# Patient Record
Sex: FEMALE | Race: ASIAN | NOT HISPANIC OR LATINO
[De-identification: names, ages, dates, MRNs, and addresses within clinical notes are randomized per-mention and may not be internally consistent; named-entity substitution may affect disease eponyms.]

---

## 2019-01-17 PROBLEM — Z00.00 ENCOUNTER FOR PREVENTIVE HEALTH EXAMINATION: Status: ACTIVE | Noted: 2019-01-17

## 2019-01-25 ENCOUNTER — APPOINTMENT (OUTPATIENT)
Dept: THORACIC SURGERY | Facility: CLINIC | Age: 64
End: 2019-01-25
Payer: COMMERCIAL

## 2019-01-25 VITALS
SYSTOLIC BLOOD PRESSURE: 138 MMHG | HEIGHT: 66 IN | RESPIRATION RATE: 18 BRPM | HEART RATE: 69 BPM | OXYGEN SATURATION: 99 % | WEIGHT: 153 LBS | TEMPERATURE: 98.2 F | DIASTOLIC BLOOD PRESSURE: 63 MMHG | BODY MASS INDEX: 24.59 KG/M2

## 2019-01-25 DIAGNOSIS — Z78.9 OTHER SPECIFIED HEALTH STATUS: ICD-10-CM

## 2019-01-25 PROCEDURE — 99205 OFFICE O/P NEW HI 60 MIN: CPT

## 2019-01-26 RX ORDER — AMOXICILLIN 500 MG/1
500 TABLET, FILM COATED ORAL
Qty: 56 | Refills: 0 | Status: DISCONTINUED | COMMUNITY
Start: 2018-12-03

## 2019-01-26 RX ORDER — DIPHENHYDRAMINE HYDROCHLORIDE 25 MG/1
25 CAPSULE ORAL
Qty: 30 | Refills: 0 | Status: DISCONTINUED | COMMUNITY
Start: 2018-11-26

## 2019-01-26 RX ORDER — ERGOCALCIFEROL 1.25 MG/1
1.25 MG CAPSULE, LIQUID FILLED ORAL
Qty: 4 | Refills: 0 | Status: DISCONTINUED | COMMUNITY
Start: 2018-12-03

## 2019-01-26 RX ORDER — SENNA 8.6 MG/1
8.6 TABLET, FILM COATED ORAL
Qty: 60 | Refills: 0 | Status: DISCONTINUED | COMMUNITY
Start: 2018-11-26

## 2019-01-26 RX ORDER — SIMETHICONE 80 MG/1
80 TABLET, CHEWABLE ORAL
Qty: 80 | Refills: 0 | Status: DISCONTINUED | COMMUNITY
Start: 2018-12-31

## 2019-01-26 RX ORDER — RANITIDINE 150 MG/1
150 TABLET ORAL
Qty: 60 | Refills: 0 | Status: DISCONTINUED | COMMUNITY
Start: 2018-11-26

## 2019-01-26 RX ORDER — OMEPRAZOLE 40 MG/1
40 CAPSULE, DELAYED RELEASE ORAL
Qty: 30 | Refills: 0 | Status: DISCONTINUED | COMMUNITY
Start: 2018-12-31

## 2019-01-26 RX ORDER — CLARITHROMYCIN 500 MG/1
500 TABLET, FILM COATED ORAL
Qty: 28 | Refills: 0 | Status: DISCONTINUED | COMMUNITY
Start: 2018-12-03

## 2019-01-26 RX ORDER — OMEPRAZOLE 20 MG/1
20 CAPSULE, DELAYED RELEASE ORAL
Qty: 28 | Refills: 0 | Status: DISCONTINUED | COMMUNITY
Start: 2018-12-03

## 2019-01-27 NOTE — ASSESSMENT
[FreeTextEntry1] : 63 year old female, Fujanese/ Mandarin speaking, was referred by gastroenterologist Dr. 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 She has tried Zantac and Omeprazole but still had gastric pain. \par \par Colonoscopy on 01/11/2019 showed and removed 2-4 mm polyps. \par \par EGD on 01/12/2019 showed a gastric mass with ulceration. Pathology revealed poorly differentiated adenocarcinoma.\par \par Chest CT on 01/16/2019:\par - mid bronchiectasis in the left lung base anteriorly\par - mild platelike atelectasis and dependent changed in the lower lobes, greater on the left\par - 0.6 cm RLL posterior subpleural nodule\par - prominent perigastric lymph nodes \par - near circumferential  wall thickening of the distal stomach, 1 cm \par - 0.8 cm left adrenal nodule\par \par EUS on 01/18/2019:\par - mass in the lesser curvature of the stomach to be T4 No Mx adenocarcinoma. \par \par On this visit, the patient reported occasional dysphagia. She further denied having a stuck sensation when swallowing, reflux, palpitations, chest pain, SOB, URI, cough, hemoptysis, fever, or recent illnesses/ hospitalizations.\par \par I have reviewed the patient’s medical records and diagnostic images during the time of this office visit and have made the following recommendations.\par Plan:\par 1. PET/CT scan\par 2. Follow up with oncologist Dr. Manjinder Garcia.\par 3. RTC after testing\par \par \par Written by Ambrocio Ham RN acting as a scribe for Dr. Flores. \par “The documentation recorded by the scribe accurately reflects the service I personally performed and the decisions made by me.” Signature Milton Flores MD.\par Date 01/25/2019

## 2019-01-27 NOTE — PHYSICAL EXAM
[General Appearance - Alert] : alert [General Appearance - In No Acute Distress] : in no acute distress [Sclera] : the sclera and conjunctiva were normal [Outer Ear] : the ears and nose were normal in appearance [Hearing Threshold Finger Rub Not [place]] : hearing was normal [Neck Appearance] : the appearance of the neck was normal [Respiration, Rhythm And Depth] : normal respiratory rhythm and effort [Exaggerated Use Of Accessory Muscles For Inspiration] : no accessory muscle use [Auscultation Breath Sounds / Voice Sounds] : lungs were clear to auscultation bilaterally [Heart Rate And Rhythm] : heart rate was normal and rhythm regular [Heart Sounds] : normal S1 and S2 [Chest Visual Inspection Thoracic Asymmetry] : no chest asymmetry [Diminished Respiratory Excursion] : normal chest expansion [2+] : left 2+ [No Pulse Delay] : no pulse delay [Bowel Sounds] : normal bowel sounds [Abdomen Soft] : soft [Abdomen Tenderness] : non-tender [Supraclavicular Lymph Nodes Enlarged Bilaterally] : supraclavicular [Axillary Lymph Nodes Enlarged Bilaterally] : axillary [No CVA Tenderness] : no ~M costovertebral angle tenderness [Nail Clubbing] : no clubbing  or cyanosis of the fingernails [Involuntary Movements] : no involuntary movements were seen [Motor Tone] : muscle strength and tone were normal [] : no rash [Skin Lesions] : no skin lesions [Sensation] : the sensory exam was normal to light touch and pinprick [Motor Exam] : the motor exam was normal [Oriented To Time, Place, And Person] : oriented to person, place, and time [Impaired Insight] : insight and judgment were intact [Fingers] :  capillary refill of the fingers was normal [FreeTextEntry1] : n/a

## 2019-01-27 NOTE — CONSULT LETTER
[Dear  ___] : Dear  [unfilled], [Consult Letter:] : I had the pleasure of evaluating your patient, [unfilled]. [( Thank you for referring [unfilled] for consultation for _____ )] : Thank you for referring [unfilled] for consultation for [unfilled] [Please see my note below.] : Please see my note below. [Consult Closing:] : Thank you very much for allowing me to participate in the care of this patient.  If you have any questions, please do not hesitate to contact me. [Sincerely,] : Sincerely, [DrJose Alejandro  ___] : Dr. DEWITT [DrJose Alejandro ___] : Dr. DEWITT [FreeTextEntry2] : Sonia Daniels MD (Ref/ GI) \par Dr. Manjinder Garcia (Onc)\par Dr. Blaire Venegas (PCP)\par \par  [FreeTextEntry3] : \par \par Milton Flores MD.\par Professor, Cardiovascular & Thoracic Surgery\par [facility] [employer] [facility] [employer]\par Director of [facility] \par Director of Thoracic Surgery, [facility]\par [facility]\par [address]\par [facility] 4th Floor\par [place] [address]\par Phone: [phone]\par Fax: [phone]

## 2019-01-27 NOTE — HISTORY OF PRESENT ILLNESS
[FreeTextEntry1] : 63 year old female, Fujanese/ Mandarin speaking, was referred by gastroenterologist Dr. 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 She has tried Zantac and Omeprazole but still had gastric pain. \par \par Colonoscopy on 01/11/2019 showed and removed 2-4 mm polyps. \par \par EGD on 01/12/2019 showed a gastric mass with ulceration. Pathology revealed poorly differentiated adenocarcinoma.\par \par Chest CT on 01/16/2019:\par - mid bronchiectasis in the left lung base anteriorly\par - mild platelike atelectasis and dependent changed in the lower lobes, greater on the left\par - 0.6 cm RLL posterior subpleural nodule\par - prominent perigastric lymph nodes \par - near circumferential  wall thickening of the distal stomach, 1 cm \par - 0.8 cm left adrenal nodule\par \par EUS on 01/18/2019:\par - mass in the lesser curvature of the stomach to be T4 No Mx adenocarcinoma. \par \par On this visit, the patient reported occasional dysphagia. She further denied having a stuck sensation when swallowing, reflux, palpitations, chest pain, SOB, URI, cough, hemoptysis, fever, or recent illnesses/ hospitalizations.

## 2019-02-04 ENCOUNTER — FORM ENCOUNTER (OUTPATIENT)
Age: 64
End: 2019-02-04

## 2019-02-05 ENCOUNTER — FORM ENCOUNTER (OUTPATIENT)
Age: 64
End: 2019-02-05

## 2019-02-05 ENCOUNTER — TRANSCRIPTION ENCOUNTER (OUTPATIENT)
Age: 64
End: 2019-02-05

## 2019-02-05 ENCOUNTER — OUTPATIENT (OUTPATIENT)
Dept: OUTPATIENT SERVICES | Facility: HOSPITAL | Age: 64
LOS: 1 days | End: 2019-02-05
Payer: COMMERCIAL

## 2019-02-05 LAB — GLUCOSE BLDC GLUCOMTR-MCNC: 89 MG/DL — SIGNIFICANT CHANGE UP (ref 70–99)

## 2019-02-05 PROCEDURE — 78815 PET IMAGE W/CT SKULL-THIGH: CPT | Mod: 26

## 2019-02-05 PROCEDURE — 78815 PET IMAGE W/CT SKULL-THIGH: CPT

## 2019-02-05 PROCEDURE — A9552: CPT

## 2019-02-05 PROCEDURE — 82962 GLUCOSE BLOOD TEST: CPT

## 2019-02-06 ENCOUNTER — RESULT REVIEW (OUTPATIENT)
Age: 64
End: 2019-02-06

## 2019-02-06 ENCOUNTER — APPOINTMENT (OUTPATIENT)
Dept: THORACIC SURGERY | Facility: HOSPITAL | Age: 64
End: 2019-02-06

## 2019-02-06 ENCOUNTER — OUTPATIENT (OUTPATIENT)
Dept: OUTPATIENT SERVICES | Facility: HOSPITAL | Age: 64
LOS: 1 days | Discharge: HOME | End: 2019-02-06
Payer: MEDICAID

## 2019-02-06 VITALS
TEMPERATURE: 98 F | OXYGEN SATURATION: 100 % | HEART RATE: 64 BPM | DIASTOLIC BLOOD PRESSURE: 60 MMHG | RESPIRATION RATE: 16 BRPM | WEIGHT: 125 LBS | HEIGHT: 65.75 IN | SYSTOLIC BLOOD PRESSURE: 119 MMHG

## 2019-02-06 VITALS
HEART RATE: 80 BPM | OXYGEN SATURATION: 100 % | SYSTOLIC BLOOD PRESSURE: 123 MMHG | DIASTOLIC BLOOD PRESSURE: 75 MMHG | RESPIRATION RATE: 16 BRPM

## 2019-02-06 DIAGNOSIS — C16.9 MALIGNANT NEOPLASM OF STOMACH, UNSPECIFIED: ICD-10-CM

## 2019-02-06 LAB
ANION GAP SERPL CALC-SCNC: 13 MMO/L — SIGNIFICANT CHANGE UP (ref 7–14)
APTT BLD: 34.2 SEC — SIGNIFICANT CHANGE UP (ref 27.5–36.3)
BLD GP AB SCN SERPL QL: NEGATIVE — SIGNIFICANT CHANGE UP
BUN SERPL-MCNC: 15 MG/DL — SIGNIFICANT CHANGE UP (ref 7–23)
CALCIUM SERPL-MCNC: 9.3 MG/DL — SIGNIFICANT CHANGE UP (ref 8.4–10.5)
CHLORIDE SERPL-SCNC: 103 MMOL/L — SIGNIFICANT CHANGE UP (ref 98–107)
CHLORIDE SERPL-SCNC: 103 MMOL/L — SIGNIFICANT CHANGE UP (ref 98–107)
CO2 SERPL-SCNC: 24 MMOL/L — SIGNIFICANT CHANGE UP (ref 22–31)
CO2 SERPL-SCNC: 24 MMOL/L — SIGNIFICANT CHANGE UP (ref 22–31)
CREAT SERPL-MCNC: 0.76 MG/DL — SIGNIFICANT CHANGE UP (ref 0.5–1.3)
GLUCOSE SERPL-MCNC: 105 MG/DL — HIGH (ref 70–99)
HCT VFR BLD CALC: 32.9 % — LOW (ref 34.5–45)
HCV AB S/CO SERPL IA: 0.05 S/CO — SIGNIFICANT CHANGE UP
HCV AB SERPL-IMP: SIGNIFICANT CHANGE UP
HGB BLD-MCNC: 10.4 G/DL — LOW (ref 11.5–15.5)
INR BLD: 1.03 — SIGNIFICANT CHANGE UP (ref 0.88–1.17)
MCHC RBC-ENTMCNC: 30.1 PG — SIGNIFICANT CHANGE UP (ref 27–34)
MCHC RBC-ENTMCNC: 31.6 % — LOW (ref 32–36)
MCV RBC AUTO: 95.1 FL — SIGNIFICANT CHANGE UP (ref 80–100)
NRBC # FLD: 0 K/UL — LOW (ref 25–125)
PLATELET # BLD AUTO: 164 K/UL — SIGNIFICANT CHANGE UP (ref 150–400)
PMV BLD: 11.5 FL — SIGNIFICANT CHANGE UP (ref 7–13)
POTASSIUM SERPL-MCNC: 4.7 MMOL/L — SIGNIFICANT CHANGE UP (ref 3.5–5.3)
POTASSIUM SERPL-MCNC: 4.7 MMOL/L — SIGNIFICANT CHANGE UP (ref 3.5–5.3)
POTASSIUM SERPL-SCNC: 4.7 MMOL/L — SIGNIFICANT CHANGE UP (ref 3.5–5.3)
POTASSIUM SERPL-SCNC: 4.7 MMOL/L — SIGNIFICANT CHANGE UP (ref 3.5–5.3)
PROTHROM AB SERPL-ACNC: 11.5 SEC — SIGNIFICANT CHANGE UP (ref 9.8–13.1)
RBC # BLD: 3.46 M/UL — LOW (ref 3.8–5.2)
RBC # FLD: 13.3 % — SIGNIFICANT CHANGE UP (ref 10.3–14.5)
RH IG SCN BLD-IMP: POSITIVE — SIGNIFICANT CHANGE UP
RH IG SCN BLD-IMP: POSITIVE — SIGNIFICANT CHANGE UP
SODIUM SERPL-SCNC: 140 MMOL/L — SIGNIFICANT CHANGE UP (ref 135–145)
SODIUM SERPL-SCNC: 140 MMOL/L — SIGNIFICANT CHANGE UP (ref 135–145)
WBC # BLD: 3.36 K/UL — LOW (ref 3.8–10.5)
WBC # FLD AUTO: 3.36 K/UL — LOW (ref 3.8–10.5)

## 2019-02-06 PROCEDURE — 36561 INSERT TUNNELED CV CATH: CPT | Mod: AS

## 2019-02-06 PROCEDURE — 71045 X-RAY EXAM CHEST 1 VIEW: CPT | Mod: 26

## 2019-02-06 PROCEDURE — 43239 EGD BIOPSY SINGLE/MULTIPLE: CPT

## 2019-02-06 PROCEDURE — 88112 CYTOPATH CELL ENHANCE TECH: CPT | Mod: 26

## 2019-02-06 PROCEDURE — 88341 IMHCHEM/IMCYTCHM EA ADD ANTB: CPT | Mod: 26,59

## 2019-02-06 PROCEDURE — 88342 IMHCHEM/IMCYTCHM 1ST ANTB: CPT | Mod: 26,59

## 2019-02-06 PROCEDURE — 88360 TUMOR IMMUNOHISTOCHEM/MANUAL: CPT | Mod: 26

## 2019-02-06 PROCEDURE — 36561 INSERT TUNNELED CV CATH: CPT

## 2019-02-06 PROCEDURE — 49321 LAPAROSCOPY BIOPSY: CPT

## 2019-02-06 PROCEDURE — 49321 LAPAROSCOPY BIOPSY: CPT | Mod: AS

## 2019-02-06 PROCEDURE — S2900 ROBOTIC SURGICAL SYSTEM: CPT

## 2019-02-06 PROCEDURE — 88305 TISSUE EXAM BY PATHOLOGIST: CPT | Mod: 26

## 2019-02-06 PROCEDURE — 88305 TISSUE EXAM BY PATHOLOGIST: CPT | Mod: 26,59

## 2019-02-06 RX ORDER — SODIUM CHLORIDE 9 MG/ML
1000 INJECTION, SOLUTION INTRAVENOUS
Qty: 0 | Refills: 0 | Status: DISCONTINUED | OUTPATIENT
Start: 2019-02-06 | End: 2019-02-21

## 2019-02-06 RX ORDER — OXYCODONE AND ACETAMINOPHEN 5; 325 MG/1; MG/1
2 TABLET ORAL EVERY 4 HOURS
Qty: 0 | Refills: 0 | Status: DISCONTINUED | OUTPATIENT
Start: 2019-02-06 | End: 2019-02-06

## 2019-02-06 RX ORDER — OXYCODONE AND ACETAMINOPHEN 5; 325 MG/1; MG/1
1 TABLET ORAL EVERY 4 HOURS
Qty: 0 | Refills: 0 | Status: DISCONTINUED | OUTPATIENT
Start: 2019-02-06 | End: 2019-02-06

## 2019-02-06 RX ORDER — ONDANSETRON 8 MG/1
4 TABLET, FILM COATED ORAL ONCE
Qty: 0 | Refills: 0 | Status: DISCONTINUED | OUTPATIENT
Start: 2019-02-06 | End: 2019-02-06

## 2019-02-06 RX ORDER — HYDROMORPHONE HYDROCHLORIDE 2 MG/ML
0.25 INJECTION INTRAMUSCULAR; INTRAVENOUS; SUBCUTANEOUS
Qty: 0 | Refills: 0 | Status: DISCONTINUED | OUTPATIENT
Start: 2019-02-06 | End: 2019-02-06

## 2019-02-06 RX ADMIN — SODIUM CHLORIDE 30 MILLILITER(S): 9 INJECTION, SOLUTION INTRAVENOUS at 09:55

## 2019-02-06 RX ADMIN — SODIUM CHLORIDE 30 MILLILITER(S): 9 INJECTION, SOLUTION INTRAVENOUS at 13:52

## 2019-02-06 NOTE — H&P ADULT - HISTORY OF PRESENT ILLNESS
63 year old female, Fujanese/ Mandarin speaking, was referred by gastroenterologist Dr. Sonia Daniels for evaluation of clinical Stage IIB (T4 N0 Mx) gastric adenocarcinoma of the lesser curvature of the stomach.    Patient, never smoker, with no significant PMH, presented to GI with complaints of epigastric pain, dyspepsia, and weight loss for several months. She has tried Zantac and Omeprazole but still had gastric pain.     Colonoscopy on 01/11/2019 showed and removed 2-4 mm polyps.     EGD on 01/12/2019 showed a gastric mass with ulceration. Pathology revealed poorly differentiated adenocarcinoma.    Chest CT on 01/16/2019:  - mid bronchiectasis in the left lung base anteriorly  - mild platelike atelectasis and dependent changed in the lower lobes, greater on the left  - 0.6 cm RLL posterior subpleural nodule  - prominent perigastric lymph nodes   - near circumferential wall thickening of the distal stomach, 1 cm   - 0.8 cm left adrenal nodule    EUS on 01/18/2019:  - mass in the lesser curvature of the stomach to be T4 No Mx adenocarcinoma.     On this visit, the patient reported occasional dysphagia. She further denied having a stuck sensation when swallowing, reflux, palpitations, chest pain, SOB, URI, cough, hemoptysis, fever, or recent illnesses/ hospitalizations.

## 2019-02-06 NOTE — ASU DISCHARGE PLAN (ADULT/PEDIATRIC). - MEDICATION SUMMARY - MEDICATIONS TO TAKE
I will START or STAY ON the medications listed below when I get home from the hospital:    Percocet 5/325 oral tablet  -- 1 tab(s) by mouth every 4 hours, As Needed -for moderate pain MDD:6 tabs   -- Caution federal law prohibits the transfer of this drug to any person other  than the person for whom it was prescribed.  May cause drowsiness.  Alcohol may intensify this effect.  Use care when operating dangerous machinery.  This prescription cannot be refilled.  This product contains acetaminophen.  Do not use  with any other product containing acetaminophen to prevent possible liver damage.  Using more of this medication than prescribed may cause serious breathing problems.    -- Indication: For Pain

## 2019-02-06 NOTE — ASU DISCHARGE PLAN (ADULT/PEDIATRIC). - ITEMS TO FOLLOWUP WITH YOUR PHYSICIAN'S
Please follow up with Dr. Milton Flores in his office within 1 week. Call his office at #[phone] to make an appointment.

## 2019-02-06 NOTE — ASU DISCHARGE PLAN (ADULT/PEDIATRIC). - NOTIFY
Bleeding that does not stop/Pain not relieved by Medications/Fever greater than 101 Bleeding that does not stop/Fever greater than 101/Inability to Tolerate Liquids or Foods/Unable to Urinate/Pain not relieved by Medications/Swelling that continues

## 2019-02-06 NOTE — H&P ADULT - ASSESSMENT
63 year old female, Fujanese/ Mandarin speaking, was referred by gastroenterologist Dr. Sonia Daniels for evaluation of clinical Stage IIB (T4 N0 Mx) gastric adenocarcinoma of the lesser curvature of the stomach.    Patient, never smoker, with no significant PMH, presented to GI with complaints of epigastric pain, dyspepsia, and weight loss for several months. She has tried Zantac and Omeprazole but still had gastric pain.     Colonoscopy on 01/11/2019 showed and removed 2-4 mm polyps.     EGD on 01/12/2019 showed a gastric mass with ulceration. Pathology revealed poorly differentiated adenocarcinoma.    Chest CT on 01/16/2019:  - mid bronchiectasis in the left lung base anteriorly  - mild platelike atelectasis and dependent changed in the lower lobes, greater on the left  - 0.6 cm RLL posterior subpleural nodule  - prominent perigastric lymph nodes   - near circumferential wall thickening of the distal stomach, 1 cm   - 0.8 cm left adrenal nodule    EUS on 01/18/2019:  - mass in the lesser curvature of the stomach to be T4 No Mx adenocarcinoma.     On this visit, the patient reported occasional dysphagia. She further denied having a stuck sensation when swallowing, reflux, palpitations, chest pain, SOB, URI, cough, hemoptysis, fever, or recent illnesses/ hospitalizations.    I have reviewed the patient’s medical records and diagnostic images during the time of this office visit and have made the following recommendations.  Pt scheduled for Diagnostic laparoscopy, robotic-assisted and Mediport insertion at [facility] on 02/06/19.

## 2019-02-06 NOTE — ASU DISCHARGE PLAN (ADULT/PEDIATRIC). - SPECIAL INSTRUCTIONS
Activity as tolerated but no heavy lifting. Increase ambulation daily. You may shower after you remove your dressings in 48 hours. Do not submerge your wounds underwater in bath or pool.   Please do not drive until your pain is well controlled, and you do not need to take pain medications. These medications may make you constipated. If so, please take over the counter stool softeners for symptoms.   You may take over the counter pain medication like tylenol, but do not take tylenol with percocet as you may exceed maximum dosage.   NOTIFY YOUR SURGEON IF: You have any bleeding that does not stop, any pus draining from your wound, any fever (over 100.4 F) or chills, persistent nausea/vomiting, shortness of breath, or if your pain is not controlled on your discharge pain medications.

## 2019-02-06 NOTE — ASU DISCHARGE PLAN (ADULT/PEDIATRIC). - POST OP PHONE #
[phone] ( son) pt. granted permission to leave message /and or speak with whoever answers the phone.

## 2019-02-06 NOTE — ASU PATIENT PROFILE, ADULT - CAREGIVER RELATION TO PATIENT
Procedure(s):  COLONOSCOPY/ 27. Anesthesia Post Evaluation      Multimodal analgesia: multimodal analgesia not used between 6 hours prior to anesthesia start to PACU discharge  Patient location during evaluation: bedside  Patient participation: complete - patient participated  Level of consciousness: awake  Pain score: 0  Pain management: adequate  Airway patency: patent  Anesthetic complications: no  Cardiovascular status: acceptable  Respiratory status: acceptable  Hydration status: acceptable  Comments: Pt doing well.    Post anesthesia nausea and vomiting:  none      Visit Vitals  /71   Pulse 71   Temp 37 °C (98.6 °F)   Resp 18   SpO2 96% spouse

## 2019-02-06 NOTE — ASU DISCHARGE PLAN (ADULT/PEDIATRIC). - NURSING INSTRUCTIONS
DO NOT take any Tylenol (Acetaminophen) or narcotics containing Tylenol until after  6:15pm . You received Tylenol during your operation and it can cause damage to your liver if too much is taken within a 24 hour time period.

## 2019-02-06 NOTE — H&P ADULT - NSHPREVIEWOFSYSTEMS_GEN_ALL_CORE
Gastrointestinal: as noted in HPI.   Constitutional, Eyes, ENT, Cardiovascular, Respiratory, Genitourinary, Musculoskeletal, Integumentary, Neurological, Psychiatric, Endocrine and Heme/Lymph are otherwise negative.

## 2019-02-06 NOTE — H&P ADULT - NSHPPHYSICALEXAM_GEN_ALL_CORE
Constitutional: alert and in no acute distress.   Eyes: the sclera and conjunctiva were normal.   ENT: the ears and nose were normal in appearance and hearing was normal.   Neck: the appearance of the neck was normal.   Pulmonary: no respiratory distress, normal respiratory rhythm and effort, no accessory muscle use and lungs were clear to auscultation bilaterally.   Heart: heart rate was normal and rhythm regular and normal S1 and S2.   Chest: no chest asymmetry and normal chest expansion.   Vascular: Radial: right 2+ and left 2+.   no pulse delay.   Capillary refill: capillary refill of the fingers was normal.   Breasts:. n/a.   Abdomen: normal bowel sounds, soft and non-tender.   Genitourinary:. n/a.   Lymphatics: The supraclavicular and axillary nodes were non-tender and normal size.   Back: no costovertebral angle tenderness.   Musculoskeletal: no clubbing or cyanosis of the fingernails, no involuntary movements were seen and muscle strength and tone were normal.   Skin: no rash and no skin lesions.   Neurological: the sensory exam was normal to light touch and pinprick and the motor exam was normal.   Psychiatric: oriented to person, place, and time and insight and judgment were intact.

## 2019-02-06 NOTE — ASU DISCHARGE PLAN (ADULT/PEDIATRIC). - PROCEDURE
EGD with biopsy, Robotic assisted Lap omental biopsy and peritoneal lavage Left Mediport placement, EGD with biopsy, Robotic assisted Lap omental biopsy and peritoneal lavage

## 2019-02-06 NOTE — ASU PATIENT PROFILE, ADULT - PMH
No pertinent past medical history <<----- Click to add NO pertinent Past Medical History Gastric cancer

## 2019-02-06 NOTE — ASU PATIENT PROFILE, ADULT - VISION (WITH CORRECTIVE LENSES IF THE PATIENT USUALLY WEARS THEM):
fabiano glasses/Partially impaired: cannot see medication labels or newsprint, but can see obstacles in path, and the surrounding layout; can count fingers at arm's length

## 2019-04-11 ENCOUNTER — MESSAGE (OUTPATIENT)
Age: 64
End: 2019-04-11

## 2019-05-01 ENCOUNTER — FORM ENCOUNTER (OUTPATIENT)
Age: 64
End: 2019-05-01

## 2019-05-02 ENCOUNTER — OUTPATIENT (OUTPATIENT)
Dept: OUTPATIENT SERVICES | Facility: HOSPITAL | Age: 64
LOS: 1 days | End: 2019-05-02
Payer: COMMERCIAL

## 2019-05-02 LAB — GLUCOSE BLDC GLUCOMTR-MCNC: 93 MG/DL — SIGNIFICANT CHANGE UP (ref 70–99)

## 2019-05-02 PROCEDURE — 78815 PET IMAGE W/CT SKULL-THIGH: CPT

## 2019-05-02 PROCEDURE — 82962 GLUCOSE BLOOD TEST: CPT

## 2019-05-02 PROCEDURE — 78815 PET IMAGE W/CT SKULL-THIGH: CPT | Mod: 26

## 2019-05-02 PROCEDURE — A9552: CPT

## 2019-05-03 ENCOUNTER — APPOINTMENT (OUTPATIENT)
Dept: THORACIC SURGERY | Facility: CLINIC | Age: 64
End: 2019-05-03
Payer: COMMERCIAL

## 2019-05-03 ENCOUNTER — OUTPATIENT (OUTPATIENT)
Dept: OUTPATIENT SERVICES | Facility: HOSPITAL | Age: 64
LOS: 1 days | End: 2019-05-03
Payer: COMMERCIAL

## 2019-05-03 VITALS
WEIGHT: 117 LBS | HEART RATE: 80 BPM | DIASTOLIC BLOOD PRESSURE: 59 MMHG | SYSTOLIC BLOOD PRESSURE: 120 MMHG | OXYGEN SATURATION: 99 % | RESPIRATION RATE: 17 BRPM | HEIGHT: 66 IN | BODY MASS INDEX: 18.8 KG/M2

## 2019-05-03 DIAGNOSIS — Z01.818 ENCOUNTER FOR OTHER PREPROCEDURAL EXAMINATION: ICD-10-CM

## 2019-05-03 LAB
ALBUMIN SERPL ELPH-MCNC: 4.3 G/DL — SIGNIFICANT CHANGE UP (ref 3.3–5)
ALP SERPL-CCNC: 74 U/L — SIGNIFICANT CHANGE UP (ref 40–120)
ALT FLD-CCNC: 6 U/L — LOW (ref 10–45)
ANION GAP SERPL CALC-SCNC: 12 MMOL/L — SIGNIFICANT CHANGE UP (ref 5–17)
APPEARANCE UR: CLEAR — SIGNIFICANT CHANGE UP
APTT BLD: 31.7 SEC — SIGNIFICANT CHANGE UP (ref 27.5–36.3)
AST SERPL-CCNC: 18 U/L — SIGNIFICANT CHANGE UP (ref 10–40)
BACTERIA # UR AUTO: PRESENT /HPF
BASOPHILS # BLD AUTO: 0.01 K/UL — SIGNIFICANT CHANGE UP (ref 0–0.2)
BASOPHILS NFR BLD AUTO: 0.3 % — SIGNIFICANT CHANGE UP (ref 0–2)
BILIRUB SERPL-MCNC: 0.4 MG/DL — SIGNIFICANT CHANGE UP (ref 0.2–1.2)
BILIRUB UR-MCNC: NEGATIVE — SIGNIFICANT CHANGE UP
BLD GP AB SCN SERPL QL: NEGATIVE — SIGNIFICANT CHANGE UP
BLD GP AB SCN SERPL QL: NEGATIVE — SIGNIFICANT CHANGE UP
BUN SERPL-MCNC: 8 MG/DL — SIGNIFICANT CHANGE UP (ref 7–23)
CALCIUM SERPL-MCNC: 9.3 MG/DL — SIGNIFICANT CHANGE UP (ref 8.4–10.5)
CHLORIDE SERPL-SCNC: 106 MMOL/L — SIGNIFICANT CHANGE UP (ref 96–108)
CHOLEST SERPL-MCNC: 254 MG/DL — HIGH (ref 10–199)
CO2 SERPL-SCNC: 25 MMOL/L — SIGNIFICANT CHANGE UP (ref 22–31)
COLOR SPEC: YELLOW — SIGNIFICANT CHANGE UP
COMMENT - URINE: SIGNIFICANT CHANGE UP
CREAT SERPL-MCNC: 0.49 MG/DL — LOW (ref 0.5–1.3)
DIFF PNL FLD: ABNORMAL
EOSINOPHIL # BLD AUTO: 0.01 K/UL — SIGNIFICANT CHANGE UP (ref 0–0.5)
EOSINOPHIL NFR BLD AUTO: 0.3 % — SIGNIFICANT CHANGE UP (ref 0–6)
EPI CELLS # UR: SIGNIFICANT CHANGE UP /HPF (ref 0–5)
GLUCOSE SERPL-MCNC: 110 MG/DL — HIGH (ref 70–99)
GLUCOSE UR QL: NEGATIVE — SIGNIFICANT CHANGE UP
HCT VFR BLD CALC: 32.6 % — LOW (ref 34.5–45)
HDLC SERPL-MCNC: 72 MG/DL — SIGNIFICANT CHANGE UP
HGB BLD-MCNC: 9.5 G/DL — LOW (ref 11.5–15.5)
IMM GRANULOCYTES NFR BLD AUTO: 0.3 % — SIGNIFICANT CHANGE UP (ref 0–1.5)
INR BLD: 0.99 — SIGNIFICANT CHANGE UP (ref 0.88–1.16)
KETONES UR-MCNC: NEGATIVE — SIGNIFICANT CHANGE UP
LEUKOCYTE ESTERASE UR-ACNC: NEGATIVE — SIGNIFICANT CHANGE UP
LIPID PNL WITH DIRECT LDL SERPL: 160 MG/DL — HIGH
LYMPHOCYTES # BLD AUTO: 1.28 K/UL — SIGNIFICANT CHANGE UP (ref 1–3.3)
LYMPHOCYTES # BLD AUTO: 36.1 % — SIGNIFICANT CHANGE UP (ref 13–44)
MCHC RBC-ENTMCNC: 29.1 GM/DL — LOW (ref 32–36)
MCHC RBC-ENTMCNC: 29.2 PG — SIGNIFICANT CHANGE UP (ref 27–34)
MCV RBC AUTO: 100.3 FL — HIGH (ref 80–100)
MONOCYTES # BLD AUTO: 0.35 K/UL — SIGNIFICANT CHANGE UP (ref 0–0.9)
MONOCYTES NFR BLD AUTO: 9.9 % — SIGNIFICANT CHANGE UP (ref 2–14)
NEUTROPHILS # BLD AUTO: 1.89 K/UL — SIGNIFICANT CHANGE UP (ref 1.8–7.4)
NEUTROPHILS NFR BLD AUTO: 53.1 % — SIGNIFICANT CHANGE UP (ref 43–77)
NITRITE UR-MCNC: NEGATIVE — SIGNIFICANT CHANGE UP
NRBC # BLD: 0 /100 WBCS — SIGNIFICANT CHANGE UP (ref 0–0)
PH UR: 6 — SIGNIFICANT CHANGE UP (ref 5–8)
PLATELET # BLD AUTO: 198 K/UL — SIGNIFICANT CHANGE UP (ref 150–400)
POTASSIUM SERPL-MCNC: 4.3 MMOL/L — SIGNIFICANT CHANGE UP (ref 3.5–5.3)
POTASSIUM SERPL-SCNC: 4.3 MMOL/L — SIGNIFICANT CHANGE UP (ref 3.5–5.3)
PROT SERPL-MCNC: 6.7 G/DL — SIGNIFICANT CHANGE UP (ref 6–8.3)
PROT UR-MCNC: NEGATIVE MG/DL — SIGNIFICANT CHANGE UP
PROTHROM AB SERPL-ACNC: 11.2 SEC — SIGNIFICANT CHANGE UP (ref 10–12.9)
RBC # BLD: 3.25 M/UL — LOW (ref 3.8–5.2)
RBC # FLD: 17.3 % — HIGH (ref 10.3–14.5)
RBC CASTS # UR COMP ASSIST: < 5 /HPF — SIGNIFICANT CHANGE UP
RH IG SCN BLD-IMP: POSITIVE — SIGNIFICANT CHANGE UP
RH IG SCN BLD-IMP: POSITIVE — SIGNIFICANT CHANGE UP
SODIUM SERPL-SCNC: 143 MMOL/L — SIGNIFICANT CHANGE UP (ref 135–145)
SP GR SPEC: 1.02 — SIGNIFICANT CHANGE UP (ref 1–1.03)
TOTAL CHOLESTEROL/HDL RATIO MEASUREMENT: 3.5 RATIO — SIGNIFICANT CHANGE UP (ref 3.3–7.1)
TRIGL SERPL-MCNC: 110 MG/DL — SIGNIFICANT CHANGE UP (ref 10–149)
UROBILINOGEN FLD QL: 0.2 E.U./DL — SIGNIFICANT CHANGE UP
WBC # BLD: 3.55 K/UL — LOW (ref 3.8–10.5)
WBC # FLD AUTO: 3.55 K/UL — LOW (ref 3.8–10.5)
WBC UR QL: < 5 /HPF — SIGNIFICANT CHANGE UP

## 2019-05-03 PROCEDURE — 99213 OFFICE O/P EST LOW 20 MIN: CPT

## 2019-05-03 PROCEDURE — 93010 ELECTROCARDIOGRAM REPORT: CPT

## 2019-05-03 NOTE — PHYSICAL EXAM
[Neck Appearance] : the appearance of the neck was normal [Neck Cervical Mass (___cm)] : no neck mass was observed [] : no respiratory distress [Exaggerated Use Of Accessory Muscles For Inspiration] : no accessory muscle use [Apical Impulse] : the apical impulse was normal [2+] : left 2+ [Abdomen Soft] : soft [Abdomen Tenderness] : non-tender [Cervical Lymph Nodes Enlarged Posterior Bilaterally] : posterior cervical [Cervical Lymph Nodes Enlarged Anterior Bilaterally] : anterior cervical [No Focal Deficits] : no focal deficits [Oriented To Time, Place, And Person] : oriented to person, place, and time

## 2019-05-05 NOTE — HISTORY OF PRESENT ILLNESS
[FreeTextEntry1] : 63 year old female, Fujanese/ Mandarin speaking, was referred by gastroenterologist Dr. 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 She has tried Zantac and Omeprazole but still had gastric pain. \par \par Colonoscopy on 01/11/2019 showed and removed 2-4 mm polyps. \par \par EGD on 01/12/2019 showed a gastric mass with ulceration. Pathology revealed poorly differentiated adenocarcinoma.\par \par Chest CT on 01/16/2019:\par - mid bronchiectasis in the left lung base anteriorly\par - mild platelike atelectasis and dependent changed in the lower lobes, greater on the left\par - 0.6 cm RLL posterior subpleural nodule\par - prominent perigastric lymph nodes \par - near circumferential wall thickening of the distal stomach, 1 cm \par - 0.8 cm left adrenal nodule\par \par EUS on 01/18/2019:\par - mass in the lesser curvature of the stomach to be T4 No Mx adenocarcinoma. \par \par CT Chest/Abd/Pelvis 4/16/19\par - Nodular pulmonary opacities RUL (3, 18)\par - Right lung base pulmonary nodule 0.7cm, previously 0.6cm overall stable (3,16)\par - Previously noted gastric antral wall thickening has slightly decreased. No bowel obstruction\par - Mild urinary bladder wall thickening anterioly, stable. \par - Uterine fibroid with possible submucosal component. \par \par PET 5/20/2019\par - No change in multiple calcified mesenteric lymph nodes, largest measuring 1.2cm. No noncalcified adenopathy\par - decrease in size of gastric mass and decrease in abnormal activity in this mass (previously 15.9, currectly SUV 4.2)\par - no change in multiple lung nodules with low-level FDG avidity. \par \par Patient returns today to discuss surgery. She completed chemotherapy with Dr Manjinder Garcia on 4/3/19. Appetite better now that chemotherapy is over. Tolerating PO intake well. Denies abdominal pain, nausea, vomiting, diarrhea, constipation, appetite changes.

## 2019-05-05 NOTE — ASSESSMENT
[FreeTextEntry1] : 63 year old female, Fujanese/ Mandarin speaking, was referred by gastroenterologist Dr. 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 She has tried Zantac and Omeprazole but still had gastric pain. \par \par Colonoscopy on 01/11/2019 showed and removed 2-4 mm polyps. \par \par EGD on 01/12/2019 showed a gastric mass with ulceration. Pathology revealed poorly differentiated adenocarcinoma.\par \par Chest CT on 01/16/2019:\par - mid bronchiectasis in the left lung base anteriorly\par - mild platelike atelectasis and dependent changed in the lower lobes, greater on the left\par - 0.6 cm RLL posterior subpleural nodule\par - prominent perigastric lymph nodes \par - near circumferential wall thickening of the distal stomach, 1 cm \par - 0.8 cm left adrenal nodule\par \par EUS on 01/18/2019:\par - mass in the lesser curvature of the stomach to be T4 No Mx adenocarcinoma. \par \par CT Chest/Abd/Pelvis 4/16/19\par - Nodular pulmonary opacities RUL (3, 18)\par - Right lung base pulmonary nodule 0.7cm, previously 0.6cm overall stable (3,16)\par - Previously noted gastric antral wall thickening has slightly decreased. No bowel obstruction\par - Mild urinary bladder wall thickening anterioly, stable. \par - Uterine fibroid with possible submucosal component. \par \par PET 5/20/2019\par - No change in multiple calcified mesenteric lymph nodes, largest measuring 1.2cm. No noncalcified adenopathy\par - decrease in size of gastric mass and decrease in abnormal activity in this mass (previously 15.9, currectly SUV 4.2)\par - no change in multiple lung nodules with low-level FDG avidity. \par \par Patient returns today to discuss surgery. She completed chemotherapy with Dr Manjinder Garcia on 4/3/19. Appetite better now that chemotherapy is over. Tolerating PO intake well. Denies abdominal pain, nausea, vomiting, diarrhea, constipation, appetite changes. \par \par The patient was counseled on the risks, benefits and alternatives of proceeding with surgery. Specifically, the risk of bleeding, risk of recurrence, postoperative pain syndrome, need for surveillance, as well as mortality was discussed with the patient who understands and agrees to the above. \par \par I have reviewed the patient's medical records and diagnostic images at the time of this office consultation and have made the following recommendation.\par Plan:\par 1. EGD, laparoscopic, robotic assited, total gastrectomy, [id number]-[id number] reconstruction, feeding tube insertion, abdominal lymph node dissection on 5/17\par 2. Medical clearance\par

## 2019-05-05 NOTE — CONSULT LETTER
[FreeTextEntry2] : Dr Sonia Daniels MD [FreeTextEntry3] : Milton Flores MD\par Professor, Cardiovascular & Thoracic Surgery\par [facility]\par Director of the [facility] \par Director of Thoracic Surgery, [facility]\par [facility]\par [address]\par [facility] 4th Floor\par [place] [address]\par Phone: [phone]\par Fax: [phone]

## 2019-05-20 ENCOUNTER — RESULT REVIEW (OUTPATIENT)
Age: 64
End: 2019-05-20

## 2019-05-20 ENCOUNTER — OUTPATIENT (OUTPATIENT)
Dept: OUTPATIENT SERVICES | Facility: HOSPITAL | Age: 64
LOS: 1 days | End: 2019-05-20
Payer: COMMERCIAL

## 2019-05-20 DIAGNOSIS — C16.9 MALIGNANT NEOPLASM OF STOMACH, UNSPECIFIED: ICD-10-CM

## 2019-05-20 LAB — SURGICAL PATHOLOGY STUDY: SIGNIFICANT CHANGE UP

## 2019-05-20 PROCEDURE — 88321 CONSLTJ&REPRT SLD PREP ELSWR: CPT

## 2019-05-23 VITALS
TEMPERATURE: 97 F | HEIGHT: 66 IN | WEIGHT: 118.83 LBS | OXYGEN SATURATION: 100 % | SYSTOLIC BLOOD PRESSURE: 112 MMHG | DIASTOLIC BLOOD PRESSURE: 58 MMHG | HEART RATE: 67 BPM | RESPIRATION RATE: 18 BRPM

## 2019-05-24 ENCOUNTER — APPOINTMENT (OUTPATIENT)
Dept: THORACIC SURGERY | Facility: HOSPITAL | Age: 64
End: 2019-05-24

## 2019-05-24 ENCOUNTER — RESULT REVIEW (OUTPATIENT)
Age: 64
End: 2019-05-24

## 2019-05-24 ENCOUNTER — INPATIENT (INPATIENT)
Facility: HOSPITAL | Age: 64
LOS: 6 days | Discharge: HOME | DRG: 327 | End: 2019-05-31
Attending: THORACIC SURGERY (CARDIOTHORACIC VASCULAR SURGERY) | Admitting: THORACIC SURGERY (CARDIOTHORACIC VASCULAR SURGERY)
Payer: COMMERCIAL

## 2019-05-24 LAB
ALBUMIN SERPL ELPH-MCNC: 3.9 G/DL — SIGNIFICANT CHANGE UP (ref 3.3–5)
ALP SERPL-CCNC: 99 U/L — SIGNIFICANT CHANGE UP (ref 40–120)
ALT FLD-CCNC: 30 U/L — SIGNIFICANT CHANGE UP (ref 10–45)
ANION GAP SERPL CALC-SCNC: 13 MMOL/L — SIGNIFICANT CHANGE UP (ref 5–17)
ANION GAP SERPL CALC-SCNC: 15 MMOL/L — SIGNIFICANT CHANGE UP (ref 5–17)
APTT BLD: 30.7 SEC — SIGNIFICANT CHANGE UP (ref 27.5–36.3)
APTT BLD: 32.4 SEC — SIGNIFICANT CHANGE UP (ref 27.5–36.3)
AST SERPL-CCNC: 69 U/L — HIGH (ref 10–40)
BASOPHILS # BLD AUTO: 0 K/UL — SIGNIFICANT CHANGE UP (ref 0–0.2)
BASOPHILS NFR BLD AUTO: 0 % — SIGNIFICANT CHANGE UP (ref 0–2)
BILIRUB SERPL-MCNC: 0.3 MG/DL — SIGNIFICANT CHANGE UP (ref 0.2–1.2)
BUN SERPL-MCNC: 11 MG/DL — SIGNIFICANT CHANGE UP (ref 7–23)
BUN SERPL-MCNC: 13 MG/DL — SIGNIFICANT CHANGE UP (ref 7–23)
CALCIUM SERPL-MCNC: 8.3 MG/DL — LOW (ref 8.4–10.5)
CALCIUM SERPL-MCNC: 8.6 MG/DL — SIGNIFICANT CHANGE UP (ref 8.4–10.5)
CHLORIDE SERPL-SCNC: 100 MMOL/L — SIGNIFICANT CHANGE UP (ref 96–108)
CHLORIDE SERPL-SCNC: 105 MMOL/L — SIGNIFICANT CHANGE UP (ref 96–108)
CO2 SERPL-SCNC: 21 MMOL/L — LOW (ref 22–31)
CO2 SERPL-SCNC: 23 MMOL/L — SIGNIFICANT CHANGE UP (ref 22–31)
CREAT SERPL-MCNC: 0.53 MG/DL — SIGNIFICANT CHANGE UP (ref 0.5–1.3)
CREAT SERPL-MCNC: 0.63 MG/DL — SIGNIFICANT CHANGE UP (ref 0.5–1.3)
EOSINOPHIL # BLD AUTO: 0 K/UL — SIGNIFICANT CHANGE UP (ref 0–0.5)
EOSINOPHIL NFR BLD AUTO: 0 % — SIGNIFICANT CHANGE UP (ref 0–6)
GLUCOSE SERPL-MCNC: 160 MG/DL — HIGH (ref 70–99)
GLUCOSE SERPL-MCNC: 165 MG/DL — HIGH (ref 70–99)
HCT VFR BLD CALC: 31 % — LOW (ref 34.5–45)
HCT VFR BLD CALC: 32 % — LOW (ref 34.5–45)
HGB BLD-MCNC: 9.5 G/DL — LOW (ref 11.5–15.5)
HGB BLD-MCNC: 9.5 G/DL — LOW (ref 11.5–15.5)
INR BLD: 0.97 — SIGNIFICANT CHANGE UP (ref 0.88–1.16)
INR BLD: 0.99 — SIGNIFICANT CHANGE UP (ref 0.88–1.16)
LYMPHOCYTES # BLD AUTO: 0.49 K/UL — LOW (ref 1–3.3)
LYMPHOCYTES # BLD AUTO: 4.3 % — LOW (ref 13–44)
MAGNESIUM SERPL-MCNC: 2.1 MG/DL — SIGNIFICANT CHANGE UP (ref 1.6–2.6)
MAGNESIUM SERPL-MCNC: 2.4 MG/DL — SIGNIFICANT CHANGE UP (ref 1.6–2.6)
MCHC RBC-ENTMCNC: 29.7 GM/DL — LOW (ref 32–36)
MCHC RBC-ENTMCNC: 29.9 PG — SIGNIFICANT CHANGE UP (ref 27–34)
MCHC RBC-ENTMCNC: 30.2 PG — SIGNIFICANT CHANGE UP (ref 27–34)
MCHC RBC-ENTMCNC: 30.6 GM/DL — LOW (ref 32–36)
MCV RBC AUTO: 100.6 FL — HIGH (ref 80–100)
MCV RBC AUTO: 98.4 FL — SIGNIFICANT CHANGE UP (ref 80–100)
MONOCYTES # BLD AUTO: 0.1 K/UL — SIGNIFICANT CHANGE UP (ref 0–0.9)
MONOCYTES NFR BLD AUTO: 0.9 % — LOW (ref 2–14)
NEUTROPHILS # BLD AUTO: 10.69 K/UL — HIGH (ref 1.8–7.4)
NEUTROPHILS NFR BLD AUTO: 94.8 % — HIGH (ref 43–77)
NRBC # BLD: 0 /100 WBCS — SIGNIFICANT CHANGE UP (ref 0–0)
PHOSPHATE SERPL-MCNC: 3.3 MG/DL — SIGNIFICANT CHANGE UP (ref 2.5–4.5)
PLATELET # BLD AUTO: 106 K/UL — LOW (ref 150–400)
PLATELET # BLD AUTO: 116 K/UL — LOW (ref 150–400)
POTASSIUM SERPL-MCNC: 4.3 MMOL/L — SIGNIFICANT CHANGE UP (ref 3.5–5.3)
POTASSIUM SERPL-MCNC: 4.5 MMOL/L — SIGNIFICANT CHANGE UP (ref 3.5–5.3)
POTASSIUM SERPL-SCNC: 4.3 MMOL/L — SIGNIFICANT CHANGE UP (ref 3.5–5.3)
POTASSIUM SERPL-SCNC: 4.5 MMOL/L — SIGNIFICANT CHANGE UP (ref 3.5–5.3)
PROT SERPL-MCNC: 6.4 G/DL — SIGNIFICANT CHANGE UP (ref 6–8.3)
PROTHROM AB SERPL-ACNC: 11 SEC — SIGNIFICANT CHANGE UP (ref 10–12.9)
PROTHROM AB SERPL-ACNC: 11.2 SEC — SIGNIFICANT CHANGE UP (ref 10–12.9)
RBC # BLD: 3.15 M/UL — LOW (ref 3.8–5.2)
RBC # BLD: 3.18 M/UL — LOW (ref 3.8–5.2)
RBC # FLD: 15.6 % — HIGH (ref 10.3–14.5)
RBC # FLD: 15.7 % — HIGH (ref 10.3–14.5)
SODIUM SERPL-SCNC: 136 MMOL/L — SIGNIFICANT CHANGE UP (ref 135–145)
SODIUM SERPL-SCNC: 141 MMOL/L — SIGNIFICANT CHANGE UP (ref 135–145)
WBC # BLD: 11.28 K/UL — HIGH (ref 3.8–10.5)
WBC # BLD: 18.76 K/UL — HIGH (ref 3.8–10.5)
WBC # FLD AUTO: 11.28 K/UL — HIGH (ref 3.8–10.5)
WBC # FLD AUTO: 18.76 K/UL — HIGH (ref 3.8–10.5)

## 2019-05-24 PROCEDURE — 44186 LAP JEJUNOSTOMY: CPT | Mod: 59

## 2019-05-24 PROCEDURE — 43235 EGD DIAGNOSTIC BRUSH WASH: CPT | Mod: 59

## 2019-05-24 PROCEDURE — S2900 ROBOTIC SURGICAL SYSTEM: CPT | Mod: NC

## 2019-05-24 PROCEDURE — 71045 X-RAY EXAM CHEST 1 VIEW: CPT | Mod: 26,76

## 2019-05-24 PROCEDURE — 38747 REMOVE ABDOMINAL LYMPH NODES: CPT | Mod: 59

## 2019-05-24 PROCEDURE — 43621 REMOVAL OF STOMACH: CPT

## 2019-05-24 PROCEDURE — 99291 CRITICAL CARE FIRST HOUR: CPT

## 2019-05-24 RX ORDER — CHLORHEXIDINE GLUCONATE 213 G/1000ML
5 SOLUTION TOPICAL EVERY 4 HOURS
Refills: 0 | Status: DISCONTINUED | OUTPATIENT
Start: 2019-05-24 | End: 2019-05-24

## 2019-05-24 RX ORDER — CEFAZOLIN SODIUM 1 G
2000 VIAL (EA) INJECTION EVERY 8 HOURS
Refills: 0 | Status: DISCONTINUED | OUTPATIENT
Start: 2019-05-24 | End: 2019-05-24

## 2019-05-24 RX ORDER — MEPERIDINE HYDROCHLORIDE 50 MG/ML
25 INJECTION INTRAMUSCULAR; INTRAVENOUS; SUBCUTANEOUS ONCE
Refills: 0 | Status: DISCONTINUED | OUTPATIENT
Start: 2019-05-24 | End: 2019-05-24

## 2019-05-24 RX ORDER — CEFAZOLIN SODIUM 1 G
2000 VIAL (EA) INJECTION EVERY 8 HOURS
Refills: 0 | Status: COMPLETED | OUTPATIENT
Start: 2019-05-24 | End: 2019-05-24

## 2019-05-24 RX ORDER — HEPARIN SODIUM 5000 [USP'U]/ML
5000 INJECTION INTRAVENOUS; SUBCUTANEOUS EVERY 8 HOURS
Refills: 0 | Status: DISCONTINUED | OUTPATIENT
Start: 2019-05-24 | End: 2019-05-31

## 2019-05-24 RX ORDER — KETOROLAC TROMETHAMINE 30 MG/ML
15 SYRINGE (ML) INJECTION EVERY 6 HOURS
Refills: 0 | Status: DISCONTINUED | OUTPATIENT
Start: 2019-05-24 | End: 2019-05-24

## 2019-05-24 RX ORDER — MORPHINE SULFATE 50 MG/1
2 CAPSULE, EXTENDED RELEASE ORAL EVERY 4 HOURS
Refills: 0 | Status: DISCONTINUED | OUTPATIENT
Start: 2019-05-24 | End: 2019-05-27

## 2019-05-24 RX ORDER — ACETAMINOPHEN 500 MG
1000 TABLET ORAL ONCE
Refills: 0 | Status: COMPLETED | OUTPATIENT
Start: 2019-05-24 | End: 2019-05-24

## 2019-05-24 RX ORDER — SODIUM CHLORIDE 9 MG/ML
1000 INJECTION, SOLUTION INTRAVENOUS
Refills: 0 | Status: DISCONTINUED | OUTPATIENT
Start: 2019-05-24 | End: 2019-05-25

## 2019-05-24 RX ORDER — SODIUM CHLORIDE 9 MG/ML
1000 INJECTION INTRAMUSCULAR; INTRAVENOUS; SUBCUTANEOUS
Refills: 0 | Status: DISCONTINUED | OUTPATIENT
Start: 2019-05-24 | End: 2019-05-27

## 2019-05-24 RX ORDER — MAGNESIUM SULFATE 500 MG/ML
2 VIAL (ML) INJECTION ONCE
Refills: 0 | Status: COMPLETED | OUTPATIENT
Start: 2019-05-24 | End: 2019-05-24

## 2019-05-24 RX ADMIN — Medication 2000 MILLIGRAM(S): at 23:28

## 2019-05-24 RX ADMIN — HEPARIN SODIUM 5000 UNIT(S): 5000 INJECTION INTRAVENOUS; SUBCUTANEOUS at 22:15

## 2019-05-24 RX ADMIN — SODIUM CHLORIDE 80 MILLILITER(S): 9 INJECTION, SOLUTION INTRAVENOUS at 14:00

## 2019-05-24 RX ADMIN — Medication 2000 MILLIGRAM(S): at 17:49

## 2019-05-24 NOTE — H&P ADULT - NSHPREVIEWOFSYSTEMS_GEN_ALL_CORE
Gastrointestinal: as noted in HPI.   Constitutional, Cardiovascular and Respiratory are otherwise negative.

## 2019-05-24 NOTE — PROGRESS NOTE ADULT - SUBJECTIVE AND OBJECTIVE BOX
CTICU  CRITICAL  CARE  attending     Hand off received 					   Pertinent clinical, laboratory, radiographic, hemodynamic, echocardiographic, respiratory data, microbiologic data and chart were reviewed and analyzed frequently throughout the course of the day and night  Patient seen and examined with CTS/ SH attending at bedside    Pt is a 63y , Female, with gastric adeno ca, s/p chemotherapy  now s/p laparoscopic total gastrectomy; Mary Ann-en-Y anastomosis and feeding J tube placement    extubated in the PACU  all drains intact  fair amount of subcutaneous emphysema  No Ptx on CXR      FAMILY HISTORY:  No pertinent family history in first degree relatives  PAST MEDICAL & SURGICAL HISTORY:  Gastric cancer: s/p chemo 4/2019  No significant past surgical history    Patient is a 63y old  Female who presents with a chief complaint of     14 system review was unremarkable  acute changes include acute respiratory failure  Vital signs, hemodynamic and respiratory parameters were reviewed from the bedside nursing flowsheet.  ICU Vital Signs Last 24 Hrs  T(C): 37.1 (24 May 2019 15:00), Max: 37.1 (24 May 2019 15:00)  T(F): 98.8 (24 May 2019 15:00), Max: 98.8 (24 May 2019 15:00)  HR: 74 (24 May 2019 16:00) (72 - 80)  BP: 123/93 (24 May 2019 16:00) (123/93 - 144/71)  BP(mean): 113 (24 May 2019 16:00) (89 - 113)  ABP: --  ABP(mean): --  RR: 19 (24 May 2019 16:00) (15 - 21)  SpO2: 100% (24 May 2019 16:00) (99% - 100%)    Adult Advanced Hemodynamics Last 24 Hrs  CVP(mm Hg): --  CVP(cm H2O): --  CO: --  CI: --  PA: --  PA(mean): --  PCWP: --  SVR: --  SVRI: --  PVR: --  PVRI: --,     Intake and output was reviewed and the fluid balance was calculated  Daily     Daily   I&O's Summary    24 May 2019 07:01  -  24 May 2019 16:53  --------------------------------------------------------  IN: 2820 mL / OUT: 795 mL / NET: 2025 mL        All lines and drain sites were assessed  Glycemic trend was reviewedCAPILLARY BLOOD GLUCOSE        No acute change in mental status  Auscultation of the chest reveals equal bs  Abdomen is soft  Extremities are warm and well perfused  Wounds appear clean and unremarkable  Antibiotics are periop    labs  CBC Full  -  ( 24 May 2019 14:20 )  WBC Count : 11.28 K/uL  RBC Count : 3.18 M/uL  Hemoglobin : 9.5 g/dL  Hematocrit : 32.0 %  Platelet Count - Automated : 116 K/uL  Mean Cell Volume : 100.6 fl  Mean Cell Hemoglobin : 29.9 pg  Mean Cell Hemoglobin Concentration : 29.7 gm/dL  Auto Neutrophil # : 10.69 K/uL  Auto Lymphocyte # : 0.49 K/uL  Auto Monocyte # : 0.10 K/uL  Auto Eosinophil # : 0.00 K/uL  Auto Basophil # : 0.00 K/uL  Auto Neutrophil % : 94.8 %  Auto Lymphocyte % : 4.3 %  Auto Monocyte % : 0.9 %  Auto Eosinophil % : 0.0 %  Auto Basophil % : 0.0 %    05-24    141  |  105  |  13  ----------------------------<  165<H>  4.5   |  23  |  0.63    Ca    8.3<L>      24 May 2019 14:20  Mg     2.4     05-24    TPro  6.4  /  Alb  3.9  /  TBili  0.3  /  DBili  x   /  AST  69<H>  /  ALT  30  /  AlkPhos  99  05-24    PT/INR - ( 24 May 2019 14:20 )   PT: 11.0 sec;   INR: 0.97          PTT - ( 24 May 2019 14:20 )  PTT:32.4 sec  The current medications were reviewed   MEDICATIONS  (STANDING):  ceFAZolin  Injectable. 2000 milliGRAM(s) IV Push every 8 hours  dextrose 5% + sodium chloride 0.45%. 1000 milliLiter(s) (80 mL/Hr) IV Continuous <Continuous>  heparin  Injectable 5000 Unit(s) SubCutaneous every 8 hours  sodium chloride 0.9%. 1000 milliLiter(s) (10 mL/Hr) IV Continuous <Continuous>    MEDICATIONS  (PRN):  ketorolac   Injectable 15 milliGRAM(s) IV Push every 6 hours PRN Moderate Pain (4 - 6)  morphine  - Injectable 2 milliGRAM(s) IV Push every 4 hours PRN Severe Pain (7 - 10)       PROBLEM LIST/ ASSESSMENT:  HEALTH ISSUES - PROBLEM Dx:    gastric ca  subcutaneous emphysema  s/p total gastrectomy    ,   Patient is a 63y old  Female who presents with a chief complaint of    s/p  laparoscopic total gastrectomy; Mary Ann-en-Y anastomosis and feeding J tube placement      My plan includes :  close hemodynamic, ventilatory and drain monitoring and management per post op routine    Monitor for arrhythmias and monitor parameters for organ perfusion  monitor neurologic status  Head of the bed should remain elevated to 45 deg .   chest PT and IS will be encouraged  monitor adequacy of oxygenation and ventilation and attempt to wean oxygen  Nutritional goals will be met using po eventually , ensure adequate caloric intake and montior the same  Stress ulcer and VTE prophylaxis will be achieved    Glycemic control is satisfactory  Electrolytes have been repleted as necessary and wound care has been carried out. Pain control has been achieved.   agressive physical therapy and early mobility and ambulation goals will be met   The family was updated about the course and plan  CRITICAL CARE TIME SPENT in evaluation and management, reassessments, review and interpretation of labs and x-rays, ventilator and hemodynamic management, formulating a plan and coordinating care: __55____ MIN.  Time does not include procedural time.  CTICU ATTENDING     					    Héctor Stewart MD

## 2019-05-24 NOTE — H&P ADULT - ASSESSMENT
63 year old female, Fuj[place]/ Mandarin speaking, was referred by gastroenterologist Dr. Sonia Daniels for evaluation of clinical Stage IIB (T4 N0 Mx) gastric adenocarcinoma of the lesser curvature of the stomach.   - consented for EGD with lap RA gastrectomy with johann-en-Y reconstruction and feeding tube insertion   - type and screen x2   - 2u PRBC on hold for OR

## 2019-05-24 NOTE — H&P ADULT - HISTORY OF PRESENT ILLNESS
63 year old female, Fuj[place]/ Mandarin speaking, was referred by gastroenterologist Dr. Sonia Daniels for evaluation of clinical Stage IIB (T4 N0 Mx) gastric adenocarcinoma of the lesser curvature of the stomach.    Patient, never smoker, with no significant PMH, presented to GI with complaints of epigastric pain, dyspepsia, and weight loss for several months. She has tried Zantac and Omeprazole but still had gastric pain.     Colonoscopy on 01/11/2019 showed and removed 2-4 mm polyps.     EGD on 01/12/2019 showed a gastric mass with ulceration. Pathology revealed poorly differentiated adenocarcinoma.    Chest CT on 01/16/2019:  - mid bronchiectasis in the left lung base anteriorly  - mild platelike atelectasis and dependent changed in the lower lobes, greater on the left  - 0.6 cm RLL posterior subpleural nodule  - prominent perigastric lymph nodes   - near circumferential wall thickening of the distal stomach, 1 cm   - 0.8 cm left adrenal nodule    EUS on 01/18/2019:  - mass in the lesser curvature of the stomach to be T4 No Mx adenocarcinoma.     CT Chest/Abd/Pelvis 4/16/19  - Nodular pulmonary opacities RUL (3, 18)  - Right lung base pulmonary nodule 0.7cm, previously 0.6cm overall stable (3,16)  - Previously noted gastric antral wall thickening has slightly decreased. No bowel obstruction  - Mild urinary bladder wall thickening anterioly, stable.   - Uterine fibroid with possible submucosal component.     PET 5/20/2019  - No change in multiple calcified mesenteric lymph nodes, largest measuring 1.2cm. No noncalcified adenopathy  - decrease in size of gastric mass and decrease in abnormal activity in this mass (previously 15.9, currectly SUV 4.2)  - no change in multiple lung nodules with low-level FDG avidity.     Patient returns today to discuss surgery. She completed chemotherapy with Dr Manjinder Garcia on 4/3/19. Appetite better now that chemotherapy is over. Tolerating PO intake well. Denies abdominal pain, nausea, vomiting, diarrhea, constipation, appetite changes.     On morning of surgery patient is in usual state of health, denies recent illnesses or hospitalizations

## 2019-05-24 NOTE — H&P ADULT - NSHPPHYSICALEXAM_GEN_ALL_CORE
Neck: the appearance of the neck was normal, the neck was supple and no neck mass was observed.   Pulmonary: no respiratory distress and no accessory muscle use.   Heart: the apical impulse was normal.   Vascular: Radial: right 2+ and left 2+.   Abdomen: soft and non-tender.   Lymphatics: The posterior cervical and anterior cervical nodes were non-tender and normal size.   Neurological: no focal deficits.   Psychiatric: oriented to person, place, and time.

## 2019-05-24 NOTE — PACU DISCHARGE NOTE - COMMENTS
REPORT GIVEN TO MARINA CEVALLOS;  TRANSPORTED TO [facility] AT 1505 VIA BED, MONITORED

## 2019-05-25 LAB
ALBUMIN SERPL ELPH-MCNC: 3.2 G/DL — LOW (ref 3.3–5)
ALBUMIN SERPL ELPH-MCNC: 3.2 G/DL — LOW (ref 3.3–5)
ALBUMIN SERPL ELPH-MCNC: 3.3 G/DL — SIGNIFICANT CHANGE UP (ref 3.3–5)
ALP SERPL-CCNC: 62 U/L — SIGNIFICANT CHANGE UP (ref 40–120)
ALP SERPL-CCNC: 66 U/L — SIGNIFICANT CHANGE UP (ref 40–120)
ALP SERPL-CCNC: 67 U/L — SIGNIFICANT CHANGE UP (ref 40–120)
ALT FLD-CCNC: 28 U/L — SIGNIFICANT CHANGE UP (ref 10–45)
ALT FLD-CCNC: 29 U/L — SIGNIFICANT CHANGE UP (ref 10–45)
ALT FLD-CCNC: 32 U/L — SIGNIFICANT CHANGE UP (ref 10–45)
ANION GAP SERPL CALC-SCNC: 10 MMOL/L — SIGNIFICANT CHANGE UP (ref 5–17)
ANION GAP SERPL CALC-SCNC: 10 MMOL/L — SIGNIFICANT CHANGE UP (ref 5–17)
ANION GAP SERPL CALC-SCNC: 11 MMOL/L — SIGNIFICANT CHANGE UP (ref 5–17)
APTT BLD: 30.5 SEC — SIGNIFICANT CHANGE UP (ref 27.5–36.3)
APTT BLD: 30.7 SEC — SIGNIFICANT CHANGE UP (ref 27.5–36.3)
APTT BLD: 31.5 SEC — SIGNIFICANT CHANGE UP (ref 27.5–36.3)
AST SERPL-CCNC: 65 U/L — HIGH (ref 10–40)
AST SERPL-CCNC: 67 U/L — HIGH (ref 10–40)
AST SERPL-CCNC: 77 U/L — HIGH (ref 10–40)
BILIRUB SERPL-MCNC: 0.6 MG/DL — SIGNIFICANT CHANGE UP (ref 0.2–1.2)
BILIRUB SERPL-MCNC: 1 MG/DL — SIGNIFICANT CHANGE UP (ref 0.2–1.2)
BILIRUB SERPL-MCNC: 1.2 MG/DL — SIGNIFICANT CHANGE UP (ref 0.2–1.2)
BUN SERPL-MCNC: 11 MG/DL — SIGNIFICANT CHANGE UP (ref 7–23)
BUN SERPL-MCNC: 13 MG/DL — SIGNIFICANT CHANGE UP (ref 7–23)
BUN SERPL-MCNC: 13 MG/DL — SIGNIFICANT CHANGE UP (ref 7–23)
CALCIUM SERPL-MCNC: 8 MG/DL — LOW (ref 8.4–10.5)
CALCIUM SERPL-MCNC: 8.4 MG/DL — SIGNIFICANT CHANGE UP (ref 8.4–10.5)
CALCIUM SERPL-MCNC: 8.6 MG/DL — SIGNIFICANT CHANGE UP (ref 8.4–10.5)
CHLORIDE SERPL-SCNC: 104 MMOL/L — SIGNIFICANT CHANGE UP (ref 96–108)
CHLORIDE SERPL-SCNC: 98 MMOL/L — SIGNIFICANT CHANGE UP (ref 96–108)
CHLORIDE SERPL-SCNC: 99 MMOL/L — SIGNIFICANT CHANGE UP (ref 96–108)
CO2 SERPL-SCNC: 22 MMOL/L — SIGNIFICANT CHANGE UP (ref 22–31)
CO2 SERPL-SCNC: 23 MMOL/L — SIGNIFICANT CHANGE UP (ref 22–31)
CO2 SERPL-SCNC: 24 MMOL/L — SIGNIFICANT CHANGE UP (ref 22–31)
CREAT SERPL-MCNC: 0.54 MG/DL — SIGNIFICANT CHANGE UP (ref 0.5–1.3)
CREAT SERPL-MCNC: 0.56 MG/DL — SIGNIFICANT CHANGE UP (ref 0.5–1.3)
CREAT SERPL-MCNC: 0.57 MG/DL — SIGNIFICANT CHANGE UP (ref 0.5–1.3)
GLUCOSE SERPL-MCNC: 123 MG/DL — HIGH (ref 70–99)
GLUCOSE SERPL-MCNC: 136 MG/DL — HIGH (ref 70–99)
GLUCOSE SERPL-MCNC: 278 MG/DL — HIGH (ref 70–99)
HCT VFR BLD CALC: 24.8 % — LOW (ref 34.5–45)
HCT VFR BLD CALC: 25.2 % — LOW (ref 34.5–45)
HCT VFR BLD CALC: 27.2 % — LOW (ref 34.5–45)
HCT VFR BLD CALC: 28.1 % — LOW (ref 34.5–45)
HCT VFR BLD CALC: 29.6 % — LOW (ref 34.5–45)
HGB BLD-MCNC: 7.7 G/DL — LOW (ref 11.5–15.5)
HGB BLD-MCNC: 7.9 G/DL — LOW (ref 11.5–15.5)
HGB BLD-MCNC: 8.8 G/DL — LOW (ref 11.5–15.5)
HGB BLD-MCNC: 9.1 G/DL — LOW (ref 11.5–15.5)
HGB BLD-MCNC: 9.5 G/DL — LOW (ref 11.5–15.5)
INR BLD: 1.08 — SIGNIFICANT CHANGE UP (ref 0.88–1.16)
INR BLD: 1.12 — SIGNIFICANT CHANGE UP (ref 0.88–1.16)
INR BLD: 1.15 — SIGNIFICANT CHANGE UP (ref 0.88–1.16)
MAGNESIUM SERPL-MCNC: 1.9 MG/DL — SIGNIFICANT CHANGE UP (ref 1.6–2.6)
MAGNESIUM SERPL-MCNC: 2 MG/DL — SIGNIFICANT CHANGE UP (ref 1.6–2.6)
MAGNESIUM SERPL-MCNC: 2 MG/DL — SIGNIFICANT CHANGE UP (ref 1.6–2.6)
MCHC RBC-ENTMCNC: 29.8 PG — SIGNIFICANT CHANGE UP (ref 27–34)
MCHC RBC-ENTMCNC: 30.2 PG — SIGNIFICANT CHANGE UP (ref 27–34)
MCHC RBC-ENTMCNC: 30.2 PG — SIGNIFICANT CHANGE UP (ref 27–34)
MCHC RBC-ENTMCNC: 30.3 PG — SIGNIFICANT CHANGE UP (ref 27–34)
MCHC RBC-ENTMCNC: 30.5 PG — SIGNIFICANT CHANGE UP (ref 27–34)
MCHC RBC-ENTMCNC: 31 GM/DL — LOW (ref 32–36)
MCHC RBC-ENTMCNC: 31.3 GM/DL — LOW (ref 32–36)
MCHC RBC-ENTMCNC: 32.1 GM/DL — SIGNIFICANT CHANGE UP (ref 32–36)
MCHC RBC-ENTMCNC: 32.4 GM/DL — SIGNIFICANT CHANGE UP (ref 32–36)
MCHC RBC-ENTMCNC: 32.4 GM/DL — SIGNIFICANT CHANGE UP (ref 32–36)
MCV RBC AUTO: 93.5 FL — SIGNIFICANT CHANGE UP (ref 80–100)
MCV RBC AUTO: 93.7 FL — SIGNIFICANT CHANGE UP (ref 80–100)
MCV RBC AUTO: 94 FL — SIGNIFICANT CHANGE UP (ref 80–100)
MCV RBC AUTO: 96.1 FL — SIGNIFICANT CHANGE UP (ref 80–100)
MCV RBC AUTO: 97.3 FL — SIGNIFICANT CHANGE UP (ref 80–100)
NRBC # BLD: 0 /100 WBCS — SIGNIFICANT CHANGE UP (ref 0–0)
PHOSPHATE SERPL-MCNC: 2.8 MG/DL — SIGNIFICANT CHANGE UP (ref 2.5–4.5)
PHOSPHATE SERPL-MCNC: 3.9 MG/DL — SIGNIFICANT CHANGE UP (ref 2.5–4.5)
PLATELET # BLD AUTO: 110 K/UL — LOW (ref 150–400)
PLATELET # BLD AUTO: 116 K/UL — LOW (ref 150–400)
PLATELET # BLD AUTO: 120 K/UL — LOW (ref 150–400)
PLATELET # BLD AUTO: 134 K/UL — LOW (ref 150–400)
PLATELET # BLD AUTO: 135 K/UL — LOW (ref 150–400)
POTASSIUM SERPL-MCNC: 4.3 MMOL/L — SIGNIFICANT CHANGE UP (ref 3.5–5.3)
POTASSIUM SERPL-MCNC: 4.4 MMOL/L — SIGNIFICANT CHANGE UP (ref 3.5–5.3)
POTASSIUM SERPL-MCNC: 5.1 MMOL/L — SIGNIFICANT CHANGE UP (ref 3.5–5.3)
POTASSIUM SERPL-SCNC: 4.3 MMOL/L — SIGNIFICANT CHANGE UP (ref 3.5–5.3)
POTASSIUM SERPL-SCNC: 4.4 MMOL/L — SIGNIFICANT CHANGE UP (ref 3.5–5.3)
POTASSIUM SERPL-SCNC: 5.1 MMOL/L — SIGNIFICANT CHANGE UP (ref 3.5–5.3)
PROT SERPL-MCNC: 5.3 G/DL — LOW (ref 6–8.3)
PROT SERPL-MCNC: 5.7 G/DL — LOW (ref 6–8.3)
PROT SERPL-MCNC: 6 G/DL — SIGNIFICANT CHANGE UP (ref 6–8.3)
PROTHROM AB SERPL-ACNC: 12.2 SEC — SIGNIFICANT CHANGE UP (ref 10–12.9)
PROTHROM AB SERPL-ACNC: 12.7 SEC — SIGNIFICANT CHANGE UP (ref 10–12.9)
PROTHROM AB SERPL-ACNC: 13.1 SEC — HIGH (ref 10–12.9)
RBC # BLD: 2.58 M/UL — LOW (ref 3.8–5.2)
RBC # BLD: 2.59 M/UL — LOW (ref 3.8–5.2)
RBC # BLD: 2.91 M/UL — LOW (ref 3.8–5.2)
RBC # BLD: 3 M/UL — LOW (ref 3.8–5.2)
RBC # BLD: 3.15 M/UL — LOW (ref 3.8–5.2)
RBC # FLD: 15.9 % — HIGH (ref 10.3–14.5)
RBC # FLD: 15.9 % — HIGH (ref 10.3–14.5)
RBC # FLD: 16 % — HIGH (ref 10.3–14.5)
RBC # FLD: 16.4 % — HIGH (ref 10.3–14.5)
RBC # FLD: 16.5 % — HIGH (ref 10.3–14.5)
SODIUM SERPL-SCNC: 131 MMOL/L — LOW (ref 135–145)
SODIUM SERPL-SCNC: 132 MMOL/L — LOW (ref 135–145)
SODIUM SERPL-SCNC: 138 MMOL/L — SIGNIFICANT CHANGE UP (ref 135–145)
WBC # BLD: 10.36 K/UL — SIGNIFICANT CHANGE UP (ref 3.8–10.5)
WBC # BLD: 12.14 K/UL — HIGH (ref 3.8–10.5)
WBC # BLD: 14.16 K/UL — HIGH (ref 3.8–10.5)
WBC # BLD: 14.51 K/UL — HIGH (ref 3.8–10.5)
WBC # BLD: 8.82 K/UL — SIGNIFICANT CHANGE UP (ref 3.8–10.5)
WBC # FLD AUTO: 10.36 K/UL — SIGNIFICANT CHANGE UP (ref 3.8–10.5)
WBC # FLD AUTO: 12.14 K/UL — HIGH (ref 3.8–10.5)
WBC # FLD AUTO: 14.16 K/UL — HIGH (ref 3.8–10.5)
WBC # FLD AUTO: 14.51 K/UL — HIGH (ref 3.8–10.5)
WBC # FLD AUTO: 8.82 K/UL — SIGNIFICANT CHANGE UP (ref 3.8–10.5)

## 2019-05-25 PROCEDURE — 99291 CRITICAL CARE FIRST HOUR: CPT

## 2019-05-25 PROCEDURE — 99292 CRITICAL CARE ADDL 30 MIN: CPT | Mod: 24

## 2019-05-25 PROCEDURE — 71045 X-RAY EXAM CHEST 1 VIEW: CPT | Mod: 26

## 2019-05-25 RX ORDER — SODIUM CHLORIDE 9 MG/ML
1000 INJECTION INTRAMUSCULAR; INTRAVENOUS; SUBCUTANEOUS
Refills: 0 | Status: DISCONTINUED | OUTPATIENT
Start: 2019-05-25 | End: 2019-05-30

## 2019-05-25 RX ADMIN — SODIUM CHLORIDE 75 MILLILITER(S): 9 INJECTION INTRAMUSCULAR; INTRAVENOUS; SUBCUTANEOUS at 15:45

## 2019-05-25 RX ADMIN — HEPARIN SODIUM 5000 UNIT(S): 5000 INJECTION INTRAVENOUS; SUBCUTANEOUS at 22:48

## 2019-05-25 RX ADMIN — HEPARIN SODIUM 5000 UNIT(S): 5000 INJECTION INTRAVENOUS; SUBCUTANEOUS at 06:22

## 2019-05-25 RX ADMIN — HEPARIN SODIUM 5000 UNIT(S): 5000 INJECTION INTRAVENOUS; SUBCUTANEOUS at 14:11

## 2019-05-25 NOTE — PROGRESS NOTE ADULT - SUBJECTIVE AND OBJECTIVE BOX
CTICU  CRITICAL  CARE  ATTENDING:   				   Pertinent clinical, laboratory, radiographic, hemodynamic, echocardiographic, respiratory data, microbiologic data and chart were reviewed and analyzed frequently throughout the course of the day and night    Patient seen and examined with CTS/ SH attending at bedside    Pt is a 63y Female admitted s/p laparoscopic total gastrectomy and J tube placement   HEALTH ISSUES - PROBLEM Dx:         FAMILY HISTORY:  No pertinent family history in first degree relatives  PAST MEDICAL & SURGICAL HISTORY:  Gastric cancer: s/p chemo 4/2019  No significant past surgical history      14 system review was unremarkable  acute changes include acute respiratory failure    Vital signs, hemodynamic and respiratory parameters were reviewed from the bedside nursing flowsheet.  ICU Vital Signs Last 24 Hrs  T(C): 36.2 (25 May 2019 10:00), Max: 37.1 (24 May 2019 15:00)  T(F): 97.1 (25 May 2019 10:00), Max: 98.8 (24 May 2019 15:00)  HR: 86 (25 May 2019 11:21) (72 - 86)  BP: 115/64 (25 May 2019 11:21) (98/61 - 144/71)  BP(mean): 80 (25 May 2019 11:21) (70 - 113)  ABP: --  ABP(mean): --  RR: 20 (25 May 2019 11:21) (11 - 25)  SpO2: 98% (25 May 2019 11:21) (97% - 100%)    Adult Advanced Hemodynamics Last 24 Hrs  CVP(mm Hg): --  CVP(cm H2O): --  CO: --  CI: --  PA: --  PA(mean): --  PCWP: --  SVR: --  SVRI: --  PVR: --  PVRI: --,     Intake and output was reviewed and the fluid balance was calculated  Daily     Daily   I&O's Summary    24 May 2019 07:01  -  25 May 2019 07:00  --------------------------------------------------------  IN: 4540 mL / OUT: 2340 mL / NET: 2200 mL    25 May 2019 07:01  -  25 May 2019 12:54  --------------------------------------------------------  IN: 280 mL / OUT: 70 mL / NET: 210 mL        All lines and drain sites were assessed  Glycemic trend was reviewedCAPILLARY BLOOD GLUCOSE            Exam:   Gen: NAD   Neuro: at baseline  Lungs: Auscultation of the chest reveals equal bs  Abd: soft, nt/nd  Ext: warm and well perfused  Wound: appears clean and unremarkable  Antibiotics are periop      labs  CBC Full  -  ( 25 May 2019 07:08 )  WBC Count : 12.14 K/uL  RBC Count : 3.00 M/uL  Hemoglobin : 9.1 g/dL  Hematocrit : 28.1 %  Platelet Count - Automated : 116 K/uL  Mean Cell Volume : 93.7 fl  Mean Cell Hemoglobin : 30.3 pg  Mean Cell Hemoglobin Concentration : 32.4 gm/dL  Auto Neutrophil # : x  Auto Lymphocyte # : x  Auto Monocyte # : x  Auto Eosinophil # : x  Auto Basophil # : x  Auto Neutrophil % : x  Auto Lymphocyte % : x  Auto Monocyte % : x  Auto Eosinophil % : x  Auto Basophil % : x    05-25    132<L>  |  99  |  11  ----------------------------<  278<H>  5.1   |  23  |  0.57    Ca    8.0<L>      25 May 2019 02:38  Phos  3.9     05-25  Mg     1.9     05-25    TPro  5.3<L>  /  Alb  3.2<L>  /  TBili  0.6  /  DBili  x   /  AST  67<H>  /  ALT  28  /  AlkPhos  66  05-25    PT/INR - ( 25 May 2019 02:38 )   PT: 13.1 sec;   INR: 1.15          PTT - ( 25 May 2019 02:38 )  PTT:31.5 sec    The current medications were reviewed   MEDICATIONS  (STANDING):  dextrose 5% + sodium chloride 0.45%. 1000 milliLiter(s) (80 mL/Hr) IV Continuous <Continuous>  heparin  Injectable 5000 Unit(s) SubCutaneous every 8 hours  sodium chloride 0.9%. 1000 milliLiter(s) (10 mL/Hr) IV Continuous <Continuous>    MEDICATIONS  (PRN):  ketorolac   Injectable 15 milliGRAM(s) IV Push every 6 hours PRN Moderate Pain (4 - 6)  morphine  - Injectable 2 milliGRAM(s) IV Push every 4 hours PRN Severe Pain (7 - 10)       PROBLEM LIST/ ASSESSMENT:  HEALTH ISSUES - PROBLEM Dx:       Pt is a 63y Female admitted s/p laparoscopic total gastrectomy and J tube placement      My plan includes :  -maintain NPO status for today.   -subq emphysema on cxr. monitor for now.   -trend H/H and transfuse as needed  -Close hemodynamic, ventilatory and drain monitoring and management per post op routine  -Monitor for arrhythmias and monitor parameters for organ perfusion  -Monitor neurologic status  -Head of the bed should remain elevated to 45 deg .   -Chest PT and IS will be encouraged  -Monitor adequacy of oxygenation and ventilation and attempt to wean oxygen  -Nutritional goals will be met using po eventually , ensure adequate caloric intake and montior the same  -Stress ulcer and VTE prophylaxis will be achieved    -Glycemic control is satisfactory  -Electrolytes have been repleated as necessary and wound care has been carried out. Pain control has been achieved.   -Agressive physical therapy and early mobility and ambulation goals will be met   The family was updated about the course and plan    CRITICAL CARE TIME SPENT in evaluation and management, reassessments, review and interpretation of labs and x-rays, ventilator and hemodynamic management, formulating a plan and coordinating care: ___60____ MIN.  Time does not include procedural time.      CTICU ATTENDING:      		  Swati Garcia MD CTICU  CRITICAL  CARE  ATTENDING:   				   Pertinent clinical, laboratory, radiographic, hemodynamic, echocardiographic, respiratory data, microbiologic data and chart were reviewed and analyzed frequently throughout the course of the day and night    Patient seen and examined with CTS/ SH attending at bedside    Pt is a 63y Female admitted s/p laparoscopic total gastrectomy and J tube placement   HEALTH ISSUES - PROBLEM Dx:         FAMILY HISTORY:  No pertinent family history in first degree relatives  PAST MEDICAL & SURGICAL HISTORY:  Gastric cancer: s/p chemo 4/2019  No significant past surgical history      14 system review was unremarkable  acute changes include acute respiratory failure    Vital signs, hemodynamic and respiratory parameters were reviewed from the bedside nursing flowsheet.  ICU Vital Signs Last 24 Hrs  T(C): 36.2 (25 May 2019 10:00), Max: 37.1 (24 May 2019 15:00)  T(F): 97.1 (25 May 2019 10:00), Max: 98.8 (24 May 2019 15:00)  HR: 86 (25 May 2019 11:21) (72 - 86)  BP: 115/64 (25 May 2019 11:21) (98/61 - 144/71)  BP(mean): 80 (25 May 2019 11:21) (70 - 113)  ABP: --  ABP(mean): --  RR: 20 (25 May 2019 11:21) (11 - 25)  SpO2: 98% (25 May 2019 11:21) (97% - 100%)    Adult Advanced Hemodynamics Last 24 Hrs  CVP(mm Hg): --  CVP(cm H2O): --  CO: --  CI: --  PA: --  PA(mean): --  PCWP: --  SVR: --  SVRI: --  PVR: --  PVRI: --,     Intake and output was reviewed and the fluid balance was calculated  Daily     Daily   I&O's Summary    24 May 2019 07:01  -  25 May 2019 07:00  --------------------------------------------------------  IN: 4540 mL / OUT: 2340 mL / NET: 2200 mL    25 May 2019 07:01  -  25 May 2019 12:54  --------------------------------------------------------  IN: 280 mL / OUT: 70 mL / NET: 210 mL        All lines and drain sites were assessed  Glycemic trend was reviewedCAPILLARY BLOOD GLUCOSE            Exam:   Gen: NAD   Neuro: at baseline  Lungs: Auscultation of the chest reveals equal bs  Abd: soft, nt/nd  Ext: warm and well perfused  Wound: appears clean and unremarkable  Antibiotics are periop      labs  CBC Full  -  ( 25 May 2019 07:08 )  WBC Count : 12.14 K/uL  RBC Count : 3.00 M/uL  Hemoglobin : 9.1 g/dL  Hematocrit : 28.1 %  Platelet Count - Automated : 116 K/uL  Mean Cell Volume : 93.7 fl  Mean Cell Hemoglobin : 30.3 pg  Mean Cell Hemoglobin Concentration : 32.4 gm/dL  Auto Neutrophil # : x  Auto Lymphocyte # : x  Auto Monocyte # : x  Auto Eosinophil # : x  Auto Basophil # : x  Auto Neutrophil % : x  Auto Lymphocyte % : x  Auto Monocyte % : x  Auto Eosinophil % : x  Auto Basophil % : x    05-25    132<L>  |  99  |  11  ----------------------------<  278<H>  5.1   |  23  |  0.57    Ca    8.0<L>      25 May 2019 02:38  Phos  3.9     05-25  Mg     1.9     05-25    TPro  5.3<L>  /  Alb  3.2<L>  /  TBili  0.6  /  DBili  x   /  AST  67<H>  /  ALT  28  /  AlkPhos  66  05-25    PT/INR - ( 25 May 2019 02:38 )   PT: 13.1 sec;   INR: 1.15          PTT - ( 25 May 2019 02:38 )  PTT:31.5 sec    The current medications were reviewed   MEDICATIONS  (STANDING):  dextrose 5% + sodium chloride 0.45%. 1000 milliLiter(s) (80 mL/Hr) IV Continuous <Continuous>  heparin  Injectable 5000 Unit(s) SubCutaneous every 8 hours  sodium chloride 0.9%. 1000 milliLiter(s) (10 mL/Hr) IV Continuous <Continuous>    MEDICATIONS  (PRN):  ketorolac   Injectable 15 milliGRAM(s) IV Push every 6 hours PRN Moderate Pain (4 - 6)  morphine  - Injectable 2 milliGRAM(s) IV Push every 4 hours PRN Severe Pain (7 - 10)       PROBLEM LIST/ ASSESSMENT:  HEALTH ISSUES - PROBLEM Dx:       Pt is a 63y Female admitted s/p laparoscopic total gastrectomy and J tube placement      My plan includes :  -maintain NPO status for today.   -subq emphysema on cxr. monitor for now.   -trend H/H and transfuse as needed  -Close hemodynamic, ventilatory and drain monitoring and management per post op routine  -Monitor for arrhythmias and monitor parameters for organ perfusion  -Monitor neurologic status  -Head of the bed should remain elevated to 45 deg .   -Chest PT and IS will be encouraged  -Monitor adequacy of oxygenation and ventilation and attempt to wean oxygen  -Nutritional goals will be met using po eventually , ensure adequate caloric intake and montior the same  -Stress ulcer and VTE prophylaxis will be achieved    -Glycemic control is satisfactory  -Electrolytes have been repleated as necessary and wound care has been carried out. Pain control has been achieved.   -Agressive physical therapy and early mobility and ambulation goals will be met   The family was updated about the course and plan    CRITICAL CARE TIME SPENT in evaluation and management, reassessments, review and interpretation of labs and x-rays, ventilator and hemodynamic management, formulating a plan and coordinating care: ___30____ MIN.  Time does not include procedural time.      CTICU ATTENDING:      		  Swati Garcia MD

## 2019-05-25 NOTE — PROGRESS NOTE ADULT - SUBJECTIVE AND OBJECTIVE BOX
CTICU  CRITICAL  CARE  attending     Hand off received 					   Pertinent clinical, laboratory, radiographic, hemodynamic, echocardiographic, respiratory data, microbiologic data and chart were reviewed and analyzed frequently throughout the course of the day and night  Patient seen and examined with CTS/ SH attending at bedside    History and Physical:   Source of Information	Chart(s)     Language:  · Patient/Family of Limited English Proficiency	Yes  · Language	Mandarin  · Please document name/ID of person providing translation, or document patient refusal	Teodoro Bradley         63 year old female, Fujanese/ Mandarin speaking, was referred by gastroenterologist Dr. Sonia Daniels for evaluation of clinical Stage IIB (T4 N0 Mx) gastric adenocarcinoma of the lesser curvature of the stomach.. Patient, never smoker, with no significant PMH, presented to GI with complaints of epigastric pain, dyspepsia, and weight loss for several months. She has tried Zantac and Omeprazole but still had gastric pain.   Colonoscopy on 01/11/2019 showed and removed 2-4 mm polyps.   EGD on 01/12/2019 showed a gastric mass with ulceration. Pathology revealed poorly differentiated adenocarcinoma.  Chest CT on 01/16/2019:  - mid bronchiectasis in the left lung base anteriorly  - mild platelike atelectasis and dependent changed in the lower lobes, greater on the left  - 0.6 cm RLL posterior subpleural nodule  - prominent perigastric lymph nodes   - near circumferential wall thickening of the distal stomach, 1 cm   - 0.8 cm left adrenal nodule    EUS on 01/18/2019:  - mass in the lesser curvature of the stomach to be T4 No Mx adenocarcinoma.     CT Chest/Abd/Pelvis 4/16/19  - Nodular pulmonary opacities RUL (3, 18)  - Right lung base pulmonary nodule 0.7cm, previously 0.6cm overall stable (3,16)  - Previously noted gastric antral wall thickening has slightly decreased. No bowel obstruction  - Mild urinary bladder wall thickening anteriorly,  stable.   - Uterine fibroid with possible submucosal component.     PET 5/20/2019  - No change in multiple calcified mesenteric lymph nodes, largest measuring 1.2cm. No noncalcified adenopathy  - decrease in size of gastric mass and decrease in abnormal activity in this mass (previously 15.9, currently  SUV 4.2)  - no change in multiple lung nodules with low-level FDG avidity.         FAMILY HISTORY:  No pertinent family history in first degree relatives  PAST MEDICAL & SURGICAL HISTORY:  Gastric cancer: s/p chemo 4/2019  No significant past surgical history    Patient is a 63y old  Female who presents with a chief complaint of     14 system review was unremarkable  acute changes include acute respiratory failure  Vital signs, hemodynamic and respiratory parameters were reviewed from the bedside nursing flow sheet.  ICU Vital Signs Last 24 Hrs  T(C): 36.7 (25 May 2019 05:00), Max: 37.1 (24 May 2019 15:00)  T(F): 98 (25 May 2019 05:00), Max: 98.8 (24 May 2019 15:00)  HR: 82 (25 May 2019 07:00) (72 - 86)  BP: 110/64 (25 May 2019 06:00) (98/61 - 144/71)  BP(mean): 81 (25 May 2019 06:00) (70 - 113)  ABP: --  ABP(mean): --  RR: 17 (25 May 2019 07:00) (11 - 25)  SpO2: 97% (25 May 2019 07:00) (97% - 100%)    Adult Advanced Hemodynamics Last 24 Hrs  CVP(mm Hg): --  CVP(cm H2O): --  CO: --  CI: --  PA: --  PA(mean): --  PCWP: --  SVR: --  SVRI: --  PVR: --  PVRI: --,     Intake and output was reviewed and the fluid balance was calculated  Daily     Daily   I&O's Summary    24 May 2019 07:01  -  25 May 2019 07:00  --------------------------------------------------------  IN: 4540 mL / OUT: 2340 mL / NET: 2200 mL        All lines and drain sites were assessed  Glycemic trend was reviewed CAPILLARY BLOOD GLUCOSE        NEURO: No acute change in mental status  CVS: S1, S2, No S3.  RESPIRATORY: Auscultation of the chest reveals equal breath sounds.  GI: Abdomen is soft. No distension. Hypoactive bowel sounds. No more bleeding from the JAYSON site after continuos oozing overnight.  Extremities are warm and well perfused.  VASCULAR: + DP/PT.  Wounds appear clean and unremarkable  Antibiotics are perioperative cefazolin.        labs  CBC Full  -  ( 25 May 2019 03:34 )  WBC Count : 14.16 K/uL  RBC Count : 2.58 M/uL  Hemoglobin : 7.7 g/dL  Hematocrit : 24.8 %  Platelet Count - Automated : 134 K/uL  Mean Cell Volume : 96.1 fl  Mean Cell Hemoglobin : 29.8 pg  Mean Cell Hemoglobin Concentration : 31.0 gm/dL  Auto Neutrophil # : x  Auto Lymphocyte # : x  Auto Monocyte # : x  Auto Eosinophil # : x  Auto Basophil # : x  Auto Neutrophil % : x  Auto Lymphocyte % : x  Auto Monocyte % : x  Auto Eosinophil % : x  Auto Basophil % : x    05-25    132<L>  |  99  |  11  ----------------------------<  278<H>  5.1   |  23  |  0.57    Ca    8.0<L>      25 May 2019 02:38  Phos  3.9     05-25  Mg     1.9     05-25    TPro  5.3<L>  /  Alb  3.2<L>  /  TBili  0.6  /  DBili  x   /  AST  67<H>  /  ALT  28  /  AlkPhos  66  05-25    PT/INR - ( 25 May 2019 02:38 )   PT: 13.1 sec;   INR: 1.15          PTT - ( 25 May 2019 02:38 )  PTT:31.5 sec  The current medications were reviewed   MEDICATIONS  (STANDING):  dextrose 5% + sodium chloride 0.45%. 1000 milliLiter(s) (80 mL/Hr) IV Continuous <Continuous>  heparin  Injectable 5000 Unit(s) SubCutaneous every 8 hours  sodium chloride 0.9%. 1000 milliLiter(s) (10 mL/Hr) IV Continuous <Continuous>    MEDICATIONS  (PRN):  ketorolac   Injectable 15 milliGRAM(s) IV Push every 6 hours PRN Moderate Pain (4 - 6)  morphine  - Injectable 2 milliGRAM(s) IV Push every 4 hours PRN Severe Pain (7 - 10)      PROBLEM LIST/ ASSESSMENT:  HEALTH ISSUES - PROBLEM Dx:        Patient is a 63y old  Female who presents with GASTRIC ADENOCARCINOMA.  S/P GASTRECTOMY  S/P Mary Ann En Y anastomosis  S/P J tube.  + Bleeding from the JAYSON drain site with drop in H/H. Got 1 unit PRBC.  Hemodynamically stable.  Good oxygenation. Fair urine output.  Overall doing well.      My plan includes :  Keep NPO.  X ray of abdomen.  Close hemodynamic, ventilatory and drain monitoring and management.  Monitor for arrhythmias and monitor parameters for organ perfusion  Monitor neurologic status  Head of the bed should remain elevated to 45 deg .   Chest PT and IS will be encouraged  Monitor adequacy of oxygenation and ventilation and attempt to wean oxygen  Nutritional goals will be met using po eventually , ensure adequate caloric intake and monitor  the same  Stress ulcer and VTE prophylaxis will be achieved    Glycemic control is satisfactory  Electrolytes have been repleted as necessary and wound care has been carried out. Pain control has been achieved.   Aggressive physical therapy and early mobility and ambulation goals will be met   The family was updated about the course and plan  CRITICAL CARE TIME SPENT in evaluation and management, reassessments, review and interpretation of labs and x-rays, ventilator and hemodynamic management, formulating a plan and coordinating care: ___60____ MIN.  Time does not include procedural time.  CTICU ATTENDING     					    Geovany Morris MD

## 2019-05-26 LAB
ALBUMIN SERPL ELPH-MCNC: 3 G/DL — LOW (ref 3.3–5)
ALP SERPL-CCNC: 61 U/L — SIGNIFICANT CHANGE UP (ref 40–120)
ALT FLD-CCNC: 26 U/L — SIGNIFICANT CHANGE UP (ref 10–45)
ANION GAP SERPL CALC-SCNC: 8 MMOL/L — SIGNIFICANT CHANGE UP (ref 5–17)
APTT BLD: 31 SEC — SIGNIFICANT CHANGE UP (ref 27.5–36.3)
AST SERPL-CCNC: 56 U/L — HIGH (ref 10–40)
BILIRUB SERPL-MCNC: 0.7 MG/DL — SIGNIFICANT CHANGE UP (ref 0.2–1.2)
BUN SERPL-MCNC: 13 MG/DL — SIGNIFICANT CHANGE UP (ref 7–23)
CALCIUM SERPL-MCNC: 8.4 MG/DL — SIGNIFICANT CHANGE UP (ref 8.4–10.5)
CHLORIDE SERPL-SCNC: 103 MMOL/L — SIGNIFICANT CHANGE UP (ref 96–108)
CO2 SERPL-SCNC: 24 MMOL/L — SIGNIFICANT CHANGE UP (ref 22–31)
CREAT SERPL-MCNC: 0.53 MG/DL — SIGNIFICANT CHANGE UP (ref 0.5–1.3)
GLUCOSE SERPL-MCNC: 109 MG/DL — HIGH (ref 70–99)
HCT VFR BLD CALC: 25.1 % — LOW (ref 34.5–45)
HGB BLD-MCNC: 8 G/DL — LOW (ref 11.5–15.5)
INR BLD: 1.03 — SIGNIFICANT CHANGE UP (ref 0.88–1.16)
MAGNESIUM SERPL-MCNC: 2.1 MG/DL — SIGNIFICANT CHANGE UP (ref 1.6–2.6)
MCHC RBC-ENTMCNC: 29.9 PG — SIGNIFICANT CHANGE UP (ref 27–34)
MCHC RBC-ENTMCNC: 31.9 GM/DL — LOW (ref 32–36)
MCV RBC AUTO: 93.7 FL — SIGNIFICANT CHANGE UP (ref 80–100)
NRBC # BLD: 0 /100 WBCS — SIGNIFICANT CHANGE UP (ref 0–0)
PHOSPHATE SERPL-MCNC: 2.7 MG/DL — SIGNIFICANT CHANGE UP (ref 2.5–4.5)
PLATELET # BLD AUTO: 103 K/UL — LOW (ref 150–400)
POTASSIUM SERPL-MCNC: 4 MMOL/L — SIGNIFICANT CHANGE UP (ref 3.5–5.3)
POTASSIUM SERPL-SCNC: 4 MMOL/L — SIGNIFICANT CHANGE UP (ref 3.5–5.3)
PROT SERPL-MCNC: 5.5 G/DL — LOW (ref 6–8.3)
PROTHROM AB SERPL-ACNC: 11.7 SEC — SIGNIFICANT CHANGE UP (ref 10–12.9)
RBC # BLD: 2.68 M/UL — LOW (ref 3.8–5.2)
RBC # FLD: 16.5 % — HIGH (ref 10.3–14.5)
SODIUM SERPL-SCNC: 135 MMOL/L — SIGNIFICANT CHANGE UP (ref 135–145)
WBC # BLD: 6.07 K/UL — SIGNIFICANT CHANGE UP (ref 3.8–10.5)
WBC # FLD AUTO: 6.07 K/UL — SIGNIFICANT CHANGE UP (ref 3.8–10.5)

## 2019-05-26 PROCEDURE — 99291 CRITICAL CARE FIRST HOUR: CPT

## 2019-05-26 PROCEDURE — 71045 X-RAY EXAM CHEST 1 VIEW: CPT | Mod: 26

## 2019-05-26 RX ORDER — ALBUMIN HUMAN 25 %
250 VIAL (ML) INTRAVENOUS ONCE
Refills: 0 | Status: COMPLETED | OUTPATIENT
Start: 2019-05-26 | End: 2019-05-26

## 2019-05-26 RX ADMIN — SODIUM CHLORIDE 50 MILLILITER(S): 9 INJECTION INTRAMUSCULAR; INTRAVENOUS; SUBCUTANEOUS at 14:10

## 2019-05-26 RX ADMIN — HEPARIN SODIUM 5000 UNIT(S): 5000 INJECTION INTRAVENOUS; SUBCUTANEOUS at 22:00

## 2019-05-26 RX ADMIN — Medication 125 MILLILITER(S): at 23:20

## 2019-05-26 RX ADMIN — HEPARIN SODIUM 5000 UNIT(S): 5000 INJECTION INTRAVENOUS; SUBCUTANEOUS at 05:55

## 2019-05-26 RX ADMIN — HEPARIN SODIUM 5000 UNIT(S): 5000 INJECTION INTRAVENOUS; SUBCUTANEOUS at 14:10

## 2019-05-26 NOTE — PROGRESS NOTE ADULT - SUBJECTIVE AND OBJECTIVE BOX
INTERVAL HPI/OVERNIGHT EVENTS:    POD #2 Lap total gastrectomy - johann-en-Y anastomosis and J tube placement    64yo Fujanese/ Mandarin speaking with Stage IIB (T4 N0 Mx) gastric adenocarcinoma of the lesser curvature of the stomach.  with sxs epigastric pain, dyspepsia, and weight loss for several months.     Colonoscopy 1/11: Polyps - removed 2-4 mm  EGD 1/12: gastric mass with ulceration. Pathology revealed poorly differentiated adenocarcinoma.    Chest CT 1/16: mid bronchiectasis in the left lung base anteriorly. mild platelike atelectasis and dependent changed in the lower lobes, greater on the left. 0.6 cm RLL posterior subpleural nodule. prominent perigastric lymph nodes. near circumferential wall thickening of the distal stomach, 1 cm . 0.8 cm left adrenal nodule    EUS 1/18: mass in the lesser curvature of the stomach to be T4 No Mx adenocarcinoma.     CT C/A/P 4/16/19: Nodular pulmonary opacities RUL (3, 18), Right lung base pulmonary nodule 0.7cm, previously 0.6cm overall stable (3,16), Previously noted gastric antral wall thickening has slightly decreased. No bowel obstruction  Mild urinary bladder wall thickening anteriorly stable. Uterine fibroid with possible submucosal component.     PET 5/20/2019: No change in multiple calcified mesenteric lymph nodes, largest measuring 1.2cm. No noncalcified adenopathy. decrease in size of gastric mass and decrease in abnormal activity in this mass (previously 15.9, currently SUV 4.2). no change in multiple lung nodules with low-level FDG avidity.     Completed chemotherapy (Dr Manjinder Garcia) 4/3/19.     Now POD #2 as above    ICU Vital Signs Last 24 Hrs  T(C): 36.3 (26 May 2019 09:59), Max: 37.4 (26 May 2019 01:01)  T(F): 97.4 (26 May 2019 09:59), Max: 99.3 (26 May 2019 01:01)  HR: 94 (26 May 2019 10:00) (82 - 100) sinus  BP: 104/54 (26 May 2019 10:00) (95/54 - 126/58)  SpO2: 96% (26 May 2019 10:00) (95% - 98%) RA    Qtts: NS 75/h    I&O's Summary    25 May 2019 07:01  -  26 May 2019 07:00  --------------------------------------------------------  IN: 1910 mL / OUT: 2890 mL / NET: -980 mL    26 May 2019 07:01  -  26 May 2019 10:17  --------------------------------------------------------  IN: 340 mL / OUT: 215 mL / NET: 125 mL    Physical Exam    Heart - regular (-)rub/gallop  Lungs - dec BS bases ; palpable SC emphysema bilateral chest wall - per thoracic and staff  Abd - (+)BS but decreased; soft non-distended - no rebound/rigidity  Ext - warm to touch; no cyanosis/clubbing  Neuro - alert/oriented and interactive - moves all extremities and non-focal  Skin - no rash    LABS:                        8.0    6.07  )-----------( 103      ( 26 May 2019 05:46 )             25.1     05-26    135  |  103  |  13  ----------------------------<  109<H>  4.0   |  24  |  0.53    Ca    8.4      26 May 2019 05:43  Phos  2.7     05-26  Mg     2.1     05-26    TPro  5.5<L>  /  Alb  3.0<L>  /  TBili  0.7  /  DBili  x   /  AST  56<H>  /  ALT  26  /  AlkPhos  61  05-26    PT/INR - ( 26 May 2019 06:07 )   PT: 11.7 sec;   INR: 1.03     PTT - ( 26 May 2019 06:07 )  PTT:31.0 sec    RADIOLOGY & ADDITIONAL STUDIES: reviewed    Patient with gastric cancer now s/p Lap total gastrectomy - johann-en-Y anastomosis and J tube placement - doing well    complete NPO - defer gastrograffin study timing to Dr Flores  plan start trophic feeds today  J tube care  pain management    up and ambulation  incentive spirometry    anticipate transfer to floor when bed available    d/w patient (via ), staff and thoracic    I have spent/provided stated minutes of critical care time to this patient: 60 min INTERVAL HPI/OVERNIGHT EVENTS:    POD #2 Lap total gastrectomy - johann-en-Y anastomosis and J tube placement    64yo Fujanese/ Mandarin speaking with Stage IIB (T4 N0 Mx) gastric adenocarcinoma of the lesser curvature of the stomach.  with sxs epigastric pain, dyspepsia, and weight loss for several months.     Colonoscopy 1/11: Polyps - removed 2-4 mm  EGD 1/12: gastric mass with ulceration. Pathology revealed poorly differentiated adenocarcinoma.    Chest CT 1/16: mid bronchiectasis in the left lung base anteriorly. mild platelike atelectasis and dependent changed in the lower lobes, greater on the left. 0.6 cm RLL posterior subpleural nodule. prominent perigastric lymph nodes. near circumferential wall thickening of the distal stomach, 1 cm . 0.8 cm left adrenal nodule    EUS 1/18: mass in the lesser curvature of the stomach to be T4 No Mx adenocarcinoma.     CT C/A/P 4/16/19: Nodular pulmonary opacities RUL (3, 18), Right lung base pulmonary nodule 0.7cm, previously 0.6cm overall stable (3,16), Previously noted gastric antral wall thickening has slightly decreased. No bowel obstruction  Mild urinary bladder wall thickening anteriorly stable. Uterine fibroid with possible submucosal component.     PET 5/20/2019: No change in multiple calcified mesenteric lymph nodes, largest measuring 1.2cm. No noncalcified adenopathy. decrease in size of gastric mass and decrease in abnormal activity in this mass (previously 15.9, currently SUV 4.2). no change in multiple lung nodules with low-level FDG avidity.     Completed chemotherapy (Dr Manjinder Garcia) 4/3/19.     Now POD #2 as above    ICU Vital Signs Last 24 Hrs  T(C): 36.3 (26 May 2019 09:59), Max: 37.4 (26 May 2019 01:01)  T(F): 97.4 (26 May 2019 09:59), Max: 99.3 (26 May 2019 01:01)  HR: 94 (26 May 2019 10:00) (82 - 100) sinus  BP: 104/54 (26 May 2019 10:00) (95/54 - 126/58)  SpO2: 96% (26 May 2019 10:00) (95% - 98%) RA    Qtts: NS 75/h    I&O's Summary    25 May 2019 07:01  -  26 May 2019 07:00  --------------------------------------------------------  IN: 1910 mL / OUT: 2890 mL / NET: -980 mL    26 May 2019 07:01  -  26 May 2019 10:17  --------------------------------------------------------  IN: 340 mL / OUT: 215 mL / NET: 125 mL    Physical Exam    Heart - regular (-)rub/gallop  Lungs - dec BS bases ; palpable SC emphysema bilateral chest wall - per thoracic and staff  Abd - (+)BS but decreased; soft slightly distended - no rebound/rigidity  Ext - warm to touch; no cyanosis/clubbing  Neuro - alert/oriented and interactive - moves all extremities and non-focal  Skin - no rash    LABS:                        8.0    6.07  )-----------( 103      ( 26 May 2019 05:46 )             25.1     05-26    135  |  103  |  13  ----------------------------<  109<H>  4.0   |  24  |  0.53    Ca    8.4      26 May 2019 05:43  Phos  2.7     05-26  Mg     2.1     05-26    TPro  5.5<L>  /  Alb  3.0<L>  /  TBili  0.7  /  DBili  x   /  AST  56<H>  /  ALT  26  /  AlkPhos  61  05-26    PT/INR - ( 26 May 2019 06:07 )   PT: 11.7 sec;   INR: 1.03     PTT - ( 26 May 2019 06:07 )  PTT:31.0 sec    RADIOLOGY & ADDITIONAL STUDIES: reviewed - dilated loops bowel noted; bilateral SC emphysema    Patient with gastric cancer now s/p Lap total gastrectomy - johann-en-Y anastomosis and J tube placement - doing well    complete NPO - defer gastrograffin study timing to Dr Flores  plan start trophic feeds today  J tube care  pain management    up and ambulation  incentive spirometry    anticipate transfer to floor when bed available    d/w patient (via ), staff and thoracic    I have spent/provided stated minutes of critical care time to this patient: 60 min

## 2019-05-27 LAB
ALBUMIN SERPL ELPH-MCNC: 3.3 G/DL — SIGNIFICANT CHANGE UP (ref 3.3–5)
ALP SERPL-CCNC: 59 U/L — SIGNIFICANT CHANGE UP (ref 40–120)
ALT FLD-CCNC: 19 U/L — SIGNIFICANT CHANGE UP (ref 10–45)
ANION GAP SERPL CALC-SCNC: 9 MMOL/L — SIGNIFICANT CHANGE UP (ref 5–17)
APTT BLD: 35.2 SEC — SIGNIFICANT CHANGE UP (ref 27.5–36.3)
AST SERPL-CCNC: 27 U/L — SIGNIFICANT CHANGE UP (ref 10–40)
BILIRUB SERPL-MCNC: 0.7 MG/DL — SIGNIFICANT CHANGE UP (ref 0.2–1.2)
BLD GP AB SCN SERPL QL: NEGATIVE — SIGNIFICANT CHANGE UP
BUN SERPL-MCNC: 14 MG/DL — SIGNIFICANT CHANGE UP (ref 7–23)
CALCIUM SERPL-MCNC: 8.4 MG/DL — SIGNIFICANT CHANGE UP (ref 8.4–10.5)
CHLORIDE SERPL-SCNC: 105 MMOL/L — SIGNIFICANT CHANGE UP (ref 96–108)
CO2 SERPL-SCNC: 24 MMOL/L — SIGNIFICANT CHANGE UP (ref 22–31)
CREAT SERPL-MCNC: 0.5 MG/DL — SIGNIFICANT CHANGE UP (ref 0.5–1.3)
GLUCOSE SERPL-MCNC: 101 MG/DL — HIGH (ref 70–99)
HCT VFR BLD CALC: 23.6 % — LOW (ref 34.5–45)
HCT VFR BLD CALC: 29 % — LOW (ref 34.5–45)
HGB BLD-MCNC: 7.5 G/DL — LOW (ref 11.5–15.5)
HGB BLD-MCNC: 9.3 G/DL — LOW (ref 11.5–15.5)
INR BLD: 1.01 — SIGNIFICANT CHANGE UP (ref 0.88–1.16)
MAGNESIUM SERPL-MCNC: 2.1 MG/DL — SIGNIFICANT CHANGE UP (ref 1.6–2.6)
MCHC RBC-ENTMCNC: 29.8 PG — SIGNIFICANT CHANGE UP (ref 27–34)
MCHC RBC-ENTMCNC: 30.4 PG — SIGNIFICANT CHANGE UP (ref 27–34)
MCHC RBC-ENTMCNC: 31.8 GM/DL — LOW (ref 32–36)
MCHC RBC-ENTMCNC: 32.1 GM/DL — SIGNIFICANT CHANGE UP (ref 32–36)
MCV RBC AUTO: 92.9 FL — SIGNIFICANT CHANGE UP (ref 80–100)
MCV RBC AUTO: 95.5 FL — SIGNIFICANT CHANGE UP (ref 80–100)
NRBC # BLD: 0 /100 WBCS — SIGNIFICANT CHANGE UP (ref 0–0)
NRBC # BLD: 0 /100 WBCS — SIGNIFICANT CHANGE UP (ref 0–0)
PHOSPHATE SERPL-MCNC: 3.3 MG/DL — SIGNIFICANT CHANGE UP (ref 2.5–4.5)
PLATELET # BLD AUTO: 102 K/UL — LOW (ref 150–400)
PLATELET # BLD AUTO: 126 K/UL — LOW (ref 150–400)
POTASSIUM SERPL-MCNC: 3.7 MMOL/L — SIGNIFICANT CHANGE UP (ref 3.5–5.3)
POTASSIUM SERPL-SCNC: 3.7 MMOL/L — SIGNIFICANT CHANGE UP (ref 3.5–5.3)
PROT SERPL-MCNC: 5.6 G/DL — LOW (ref 6–8.3)
PROTHROM AB SERPL-ACNC: 11.4 SEC — SIGNIFICANT CHANGE UP (ref 10–12.9)
RBC # BLD: 2.47 M/UL — LOW (ref 3.8–5.2)
RBC # BLD: 3.12 M/UL — LOW (ref 3.8–5.2)
RBC # FLD: 15.9 % — HIGH (ref 10.3–14.5)
RBC # FLD: 16.5 % — HIGH (ref 10.3–14.5)
RH IG SCN BLD-IMP: POSITIVE — SIGNIFICANT CHANGE UP
SODIUM SERPL-SCNC: 138 MMOL/L — SIGNIFICANT CHANGE UP (ref 135–145)
WBC # BLD: 6.17 K/UL — SIGNIFICANT CHANGE UP (ref 3.8–10.5)
WBC # BLD: 7.89 K/UL — SIGNIFICANT CHANGE UP (ref 3.8–10.5)
WBC # FLD AUTO: 6.17 K/UL — SIGNIFICANT CHANGE UP (ref 3.8–10.5)
WBC # FLD AUTO: 7.89 K/UL — SIGNIFICANT CHANGE UP (ref 3.8–10.5)

## 2019-05-27 PROCEDURE — 85730 THROMBOPLASTIN TIME PARTIAL: CPT

## 2019-05-27 PROCEDURE — 86850 RBC ANTIBODY SCREEN: CPT

## 2019-05-27 PROCEDURE — 85610 PROTHROMBIN TIME: CPT

## 2019-05-27 PROCEDURE — 36430 TRANSFUSION BLD/BLD COMPNT: CPT

## 2019-05-27 PROCEDURE — 71045 X-RAY EXAM CHEST 1 VIEW: CPT | Mod: 26

## 2019-05-27 PROCEDURE — 81001 URINALYSIS AUTO W/SCOPE: CPT

## 2019-05-27 PROCEDURE — 80061 LIPID PANEL: CPT

## 2019-05-27 PROCEDURE — 85025 COMPLETE CBC W/AUTO DIFF WBC: CPT

## 2019-05-27 PROCEDURE — 93005 ELECTROCARDIOGRAM TRACING: CPT

## 2019-05-27 PROCEDURE — 99233 SBSQ HOSP IP/OBS HIGH 50: CPT

## 2019-05-27 PROCEDURE — 86901 BLOOD TYPING SEROLOGIC RH(D): CPT

## 2019-05-27 PROCEDURE — 86900 BLOOD TYPING SEROLOGIC ABO: CPT

## 2019-05-27 PROCEDURE — 80053 COMPREHEN METABOLIC PANEL: CPT

## 2019-05-27 RX ORDER — POTASSIUM CHLORIDE 20 MEQ
10 PACKET (EA) ORAL ONCE
Refills: 0 | Status: COMPLETED | OUTPATIENT
Start: 2019-05-27 | End: 2019-05-27

## 2019-05-27 RX ORDER — SODIUM CHLORIDE 9 MG/ML
3 INJECTION INTRAMUSCULAR; INTRAVENOUS; SUBCUTANEOUS EVERY 8 HOURS
Refills: 0 | Status: DISCONTINUED | OUTPATIENT
Start: 2019-05-27 | End: 2019-05-31

## 2019-05-27 RX ADMIN — HEPARIN SODIUM 5000 UNIT(S): 5000 INJECTION INTRAVENOUS; SUBCUTANEOUS at 15:32

## 2019-05-27 RX ADMIN — HEPARIN SODIUM 5000 UNIT(S): 5000 INJECTION INTRAVENOUS; SUBCUTANEOUS at 21:51

## 2019-05-27 RX ADMIN — SODIUM CHLORIDE 50 MILLILITER(S): 9 INJECTION INTRAMUSCULAR; INTRAVENOUS; SUBCUTANEOUS at 08:00

## 2019-05-27 RX ADMIN — Medication 100 MILLIEQUIVALENT(S): at 08:25

## 2019-05-27 RX ADMIN — Medication 100 MILLIEQUIVALENT(S): at 03:38

## 2019-05-27 RX ADMIN — SODIUM CHLORIDE 3 MILLILITER(S): 9 INJECTION INTRAMUSCULAR; INTRAVENOUS; SUBCUTANEOUS at 21:49

## 2019-05-27 RX ADMIN — HEPARIN SODIUM 5000 UNIT(S): 5000 INJECTION INTRAVENOUS; SUBCUTANEOUS at 06:00

## 2019-05-27 RX ADMIN — SODIUM CHLORIDE 3 MILLILITER(S): 9 INJECTION INTRAMUSCULAR; INTRAVENOUS; SUBCUTANEOUS at 13:53

## 2019-05-28 PROBLEM — C16.9 MALIGNANT NEOPLASM OF STOMACH, UNSPECIFIED: Chronic | Status: ACTIVE | Noted: 2019-02-06

## 2019-05-28 LAB
ANION GAP SERPL CALC-SCNC: 8 MMOL/L — SIGNIFICANT CHANGE UP (ref 5–17)
BUN SERPL-MCNC: 13 MG/DL — SIGNIFICANT CHANGE UP (ref 7–23)
CALCIUM SERPL-MCNC: 8.5 MG/DL — SIGNIFICANT CHANGE UP (ref 8.4–10.5)
CHLORIDE SERPL-SCNC: 105 MMOL/L — SIGNIFICANT CHANGE UP (ref 96–108)
CO2 SERPL-SCNC: 25 MMOL/L — SIGNIFICANT CHANGE UP (ref 22–31)
CREAT SERPL-MCNC: 0.48 MG/DL — LOW (ref 0.5–1.3)
GLUCOSE SERPL-MCNC: 116 MG/DL — HIGH (ref 70–99)
HCT VFR BLD CALC: 27.3 % — LOW (ref 34.5–45)
HGB BLD-MCNC: 8.8 G/DL — LOW (ref 11.5–15.5)
MAGNESIUM SERPL-MCNC: 2.2 MG/DL — SIGNIFICANT CHANGE UP (ref 1.6–2.6)
MCHC RBC-ENTMCNC: 29.9 PG — SIGNIFICANT CHANGE UP (ref 27–34)
MCHC RBC-ENTMCNC: 32.2 GM/DL — SIGNIFICANT CHANGE UP (ref 32–36)
MCV RBC AUTO: 92.9 FL — SIGNIFICANT CHANGE UP (ref 80–100)
NRBC # BLD: 0 /100 WBCS — SIGNIFICANT CHANGE UP (ref 0–0)
PLATELET # BLD AUTO: 119 K/UL — LOW (ref 150–400)
POTASSIUM SERPL-MCNC: 4.2 MMOL/L — SIGNIFICANT CHANGE UP (ref 3.5–5.3)
POTASSIUM SERPL-SCNC: 4.2 MMOL/L — SIGNIFICANT CHANGE UP (ref 3.5–5.3)
RBC # BLD: 2.94 M/UL — LOW (ref 3.8–5.2)
RBC # FLD: 16.4 % — HIGH (ref 10.3–14.5)
SODIUM SERPL-SCNC: 138 MMOL/L — SIGNIFICANT CHANGE UP (ref 135–145)
WBC # BLD: 6.96 K/UL — SIGNIFICANT CHANGE UP (ref 3.8–10.5)
WBC # FLD AUTO: 6.96 K/UL — SIGNIFICANT CHANGE UP (ref 3.8–10.5)

## 2019-05-28 PROCEDURE — 71045 X-RAY EXAM CHEST 1 VIEW: CPT | Mod: 26

## 2019-05-28 RX ADMIN — HEPARIN SODIUM 5000 UNIT(S): 5000 INJECTION INTRAVENOUS; SUBCUTANEOUS at 21:40

## 2019-05-28 RX ADMIN — SODIUM CHLORIDE 3 MILLILITER(S): 9 INJECTION INTRAMUSCULAR; INTRAVENOUS; SUBCUTANEOUS at 13:55

## 2019-05-28 RX ADMIN — SODIUM CHLORIDE 3 MILLILITER(S): 9 INJECTION INTRAMUSCULAR; INTRAVENOUS; SUBCUTANEOUS at 06:00

## 2019-05-28 RX ADMIN — HEPARIN SODIUM 5000 UNIT(S): 5000 INJECTION INTRAVENOUS; SUBCUTANEOUS at 06:30

## 2019-05-28 RX ADMIN — SODIUM CHLORIDE 3 MILLILITER(S): 9 INJECTION INTRAMUSCULAR; INTRAVENOUS; SUBCUTANEOUS at 21:47

## 2019-05-28 RX ADMIN — HEPARIN SODIUM 5000 UNIT(S): 5000 INJECTION INTRAVENOUS; SUBCUTANEOUS at 14:50

## 2019-05-28 NOTE — PHYSICAL THERAPY INITIAL EVALUATION ADULT - GENERAL OBSERVATIONS, REHAB EVAL
Patient encountered seated OOB in chair, NAD, +CB, spouse present, RN cleared patient to ambulate, +J-tube, +NGT, +JPx1, +heplock IV, VSS t/o,  line utilized (patient mandarin speaking).

## 2019-05-28 NOTE — PROGRESS NOTE ADULT - SUBJECTIVE AND OBJECTIVE BOX
63F with Patient discussed on morning rounds with Dr. Luo    Operation / Date:     SUBJECTIVE ASSESSMENT:  63 year old female never smoker, Fujanese / Mandarin speaking, with a history of stage 2B gastric adenocarcinoma of the lesser curvature of the stomach s/p chemotherapy, stable lung nodules. Colonoscopy on 01/11/2019 removed 2-4 mm polyps. EGD on 01/12/2019 showed a gastric mass with ulceration. Pathology revealed poorly differentiated adenocarcinoma. She is now s/p lap total gastrectomy, Mary Ann-en-Y reconstruction and j tube placement with Dr. Flores on 5/24/19. Post op transferred to CTICU, extubated. Overnight had oozing around JAYSON site, given PRBCs. On POD 2 trophic feeds started, pt tolerated well, and now uptitrated to 20cc/hr. On POD 3, given 1U PRBCs for hgb 7.5 with appropriate response. Pt now transferred to  in stable condition. Currently ___.       Vital Signs Last 24 Hrs  T(C): 36.2 (28 May 2019 13:39), Max: 37.2 (28 May 2019 05:01)  T(F): 97.2 (28 May 2019 13:39), Max: 99 (28 May 2019 05:01)  HR: 80 (28 May 2019 13:16) (76 - 88)  BP: 92/53 (28 May 2019 13:16) (91/57 - 110/76)  BP(mean): 69 (28 May 2019 13:16) (66 - 93)  RR: 20 (28 May 2019 13:16) (17 - 31)  SpO2: 96% (28 May 2019 13:16) (96% - 99%)  I&O's Detail    27 May 2019 07:01  -  28 May 2019 07:00  --------------------------------------------------------  IN:    Enteral Tube Flush: 100 mL    Enteral Tube Flush: 100 mL    ns in tub fed  [id number]: 330 mL    sodium chloride 0.9%.: 1050 mL  Total IN: 1580 mL    OUT:    Bulb: 115 mL    Nasoenteral Tube: 20 mL    Voided: 950 mL  Total OUT: 1085 mL    Total NET: 495 mL      28 May 2019 07:01  -  28 May 2019 15:21  --------------------------------------------------------  IN:    Enteral Tube Flush: 40 mL    Enteral Tube Flush: 40 mL    ns in tub fed  [id number]: 160 mL    sodium chloride 0.9%.: 400 mL  Total IN: 640 mL    OUT:    Nasoenteral Tube: 20 mL    Voided: 375 mL  Total OUT: 395 mL    Total NET: 245 mL          CHEST TUBE:  Yes/No. AIR LEAKS: Yes/No. Suction / H2O SEAL.   RANGEL DRAIN:  Yes/No.  EPICARDIAL WIRES: Yes/No.  TIE DOWNS: Yes/No.  LANDRY: Yes/No.    PHYSICAL EXAM:    General:     Neurological:    Cardiovascular:    Respiratory:    Gastrointestinal:    Extremities:    Vascular:    Incision Sites:    LABS:                        8.8    6.96  )-----------( 119      ( 28 May 2019 03:27 )             27.3       COUMADIN:  Yes/No. REASON: .    PT/INR - ( 27 May 2019 02:29 )   PT: 11.4 sec;   INR: 1.01          PTT - ( 27 May 2019 02:29 )  PTT:35.2 sec    05-28    138  |  105  |  13  ----------------------------<  116<H>  4.2   |  25  |  0.48<L>    Ca    8.5      28 May 2019 03:27  Phos  3.3     05-27  Mg     2.2     05-28    TPro  5.6<L>  /  Alb  3.3  /  TBili  0.7  /  DBili  x   /  AST  27  /  ALT  19  /  AlkPhos  59  05-27          MEDICATIONS  (STANDING):  heparin  Injectable 5000 Unit(s) SubCutaneous every 8 hours  sodium chloride 0.9% lock flush 3 milliLiter(s) IV Push every 8 hours  sodium chloride 0.9%. 1000 milliLiter(s) (50 mL/Hr) IV Continuous <Continuous>    MEDICATIONS  (PRN):  ketorolac   Injectable 15 milliGRAM(s) IV Push every 6 hours PRN Moderate Pain (4 - 6)        RADIOLOGY & ADDITIONAL TESTS: Patient discussed on morning rounds with Dr. Luo    Operation / Date:     SUBJECTIVE ASSESSMENT:  [id number]  63 year old female never smoker, Fujanese / Mandarin speaking, with a history of stage 2B gastric adenocarcinoma of the lesser curvature of the stomach s/p chemotherapy, stable lung nodules. Colonoscopy on 01/11/2019 removed 2-4 mm polyps. EGD on 01/12/2019 showed a gastric mass with ulceration. Pathology revealed poorly differentiated adenocarcinoma. She is now s/p lap total gastrectomy, Mary Ann-en-Y reconstruction and j tube placement with Dr. Flores on 5/24/19. Post op transferred to CTICU, extubated. Overnight had oozing around JAYSON site, given PRBCs. On POD 2 trophic feeds started, pt tolerated well, and now uptitrated to 20cc/hr. On POD 3, given 1U PRBCs for hgb 7.5 with appropriate response. Pt now transferred to  in stable condition. Currently pt c/o some fatigue, but otherwise feels well. Denies any pain, SOB, N/V, diarrhea. No BM post op. Has been getting OOB, to chair, ambulating in halls.           Vital Signs Last 24 Hrs  T(C): 36.2 (28 May 2019 13:39), Max: 37.2 (28 May 2019 05:01)  T(F): 97.2 (28 May 2019 13:39), Max: 99 (28 May 2019 05:01)  HR: 80 (28 May 2019 13:16) (76 - 88)  BP: 92/53 (28 May 2019 13:16) (91/57 - 110/76)  BP(mean): 69 (28 May 2019 13:16) (66 - 93)  RR: 20 (28 May 2019 13:16) (17 - 31)  SpO2: 96% (28 May 2019 13:16) (96% - 99%)  I&O's Detail    27 May 2019 07:01  -  28 May 2019 07:00  --------------------------------------------------------  IN:    Enteral Tube Flush: 100 mL    Enteral Tube Flush: 100 mL    ns in tub fed  [id number]: 330 mL    sodium chloride 0.9%.: 1050 mL  Total IN: 1580 mL    OUT:    Bulb: 115 mL    Nasoenteral Tube: 20 mL    Voided: 950 mL  Total OUT: 1085 mL    Total NET: 495 mL      28 May 2019 07:01  -  28 May 2019 15:21  --------------------------------------------------------  IN:    Enteral Tube Flush: 40 mL    Enteral Tube Flush: 40 mL    ns in tub fed  [id number]: 160 mL    sodium chloride 0.9%.: 400 mL  Total IN: 640 mL    OUT:    Nasoenteral Tube: 20 mL    Voided: 375 mL  Total OUT: 395 mL    Total NET: 245 mL          CHEST TUBE:  No.    RANGEL DRAIN:  No, JAYSON  EPICARDIAL WIRES: No.  TIE DOWNS: No.  LANDRY: No.    PHYSICAL EXAM:    General: NAD, laying flat in bed, appears chronically ill. +NGT    Neurological: AAOx3, no pain    Cardiovascular: RRR    Respiratory: CTA b/l, unlabored on room air     Gastrointestinal: soft, NTND. JAYSON drain RLQ w/ serous fluid, J-tube LLQ, suture in place    Extremities: WWP, no edema     Vascular: +2 pedal and radial pulses b/l    Incision Sites: VATS incisions CDI, no drainage or erythema     LABS:                        8.8    6.96  )-----------( 119      ( 28 May 2019 03:27 )             27.3       COUMADIN:  No.     PT/INR - ( 27 May 2019 02:29 )   PT: 11.4 sec;   INR: 1.01          PTT - ( 27 May 2019 02:29 )  PTT:35.2 sec    05-28    138  |  105  |  13  ----------------------------<  116<H>  4.2   |  25  |  0.48<L>    Ca    8.5      28 May 2019 03:27  Phos  3.3     05-27  Mg     2.2     05-28    TPro  5.6<L>  /  Alb  3.3  /  TBili  0.7  /  DBili  x   /  AST  27  /  ALT  19  /  AlkPhos  59  05-27          MEDICATIONS  (STANDING):  heparin  Injectable 5000 Unit(s) SubCutaneous every 8 hours  sodium chloride 0.9% lock flush 3 milliLiter(s) IV Push every 8 hours  sodium chloride 0.9%. 1000 milliLiter(s) (50 mL/Hr) IV Continuous <Continuous>    MEDICATIONS  (PRN):  ketorolac   Injectable 15 milliGRAM(s) IV Push every 6 hours PRN Moderate Pain (4 - 6)        RADIOLOGY & ADDITIONAL TESTS:

## 2019-05-28 NOTE — PHYSICAL THERAPY INITIAL EVALUATION ADULT - CRITERIA FOR SKILLED THERAPEUTIC INTERVENTIONS
predicted duration of therapy intervention/risk reduction/prevention/therapy frequency/functional limitations in following categories/rehab potential/anticipated discharge recommendation/impairments found

## 2019-05-28 NOTE — PHYSICAL THERAPY INITIAL EVALUATION ADULT - PERTINENT HX OF CURRENT PROBLEM, REHAB EVAL
Patient is a 64 y/o F who presented to GI with complaints of epigastric pain, dyspepsia, and weight loss for several months.Colonoscopy on 01/11/2019 removed 2-4 mm polyps. EGD on 01/12/2019 showed a gastric mass with ulceration. Pathology revealed poorly differentiated adenocarcinoma.

## 2019-05-28 NOTE — PROGRESS NOTE ADULT - ASSESSMENT
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 Colonoscopy on 01/11/2019 removed 2-4 mm polyps. EGD on 01/12/2019 showed a gastric mass with ulceration. Pathology revealed poorly differentiated adenocarcinoma. She is now s/p lap total gastrectomy, Mary Ann-en-Y reconstruction and j tube placement with Dr. Flores on 5/24/19. Post op transferred to CTICU, extubated. Overnight had oozing around JAYSON site, given PRBCs. On POD 2 trophic feeds started, pt tolerated well, and now uptitrated to 20cc/hr. On POD 3, given 1U PRBCs for hgb 7.5 with appropriate response. Pt now transferred to  in stable condition.     Neuro:   - pain controlled on current regimen     Cards:   - HR/BP stable, no medication    Resp:   - breathing comfortably on room air     GI:   - NGT/ J tube flush q 4 hours. Cont feeds, uptitrate per Dr. Flores/ as patient tolerates toward goal  - Due for esophagram tomorrow  - monitor JAYSON drainage    Renal:   - no current issues, monitor I/O    ID:   - no current issues, WBC 6, afebrile    PT/OT  DVT ppx   dispo planning pending medical stability 63 year old female never smoker, Fujanese / Mandarin speaking, with a history of stage 2B gastric adenocarcinoma of the lesser curvature of the stomach s/p chemotherapy, stable lung nodules. Colonoscopy on 01/11/2019 removed 2-4 mm polyps. EGD on 01/12/2019 showed a gastric mass with ulceration. Pathology revealed poorly differentiated adenocarcinoma. She is now s/p lap total gastrectomy, Mary Ann-en-Y reconstruction and j tube placement with Dr. Flores on 5/24/19. Post op transferred to [facility]ICU, extubated. Overnight had oozing around JAYSON site, given PRBCs. On POD 2 trophic feeds started, pt tolerated well, and now uptitrated to 20cc/hr. On POD 3, given 1U PRBCs for hgb 7.5 with appropriate response. Pt now transferred to  in stable condition.     Neuro:   - pain controlled on current regimen     Cards:   - HR/BP stable, no medication    Resp:   - breathing comfortably on room air     GI:   - NGT/ J tube flush q 4 hours. Cont feeds, uptitrate per Dr. Flores/ as patient tolerates toward goal  - Due for esophagram tomorrow  - monitor JAYSON drainage    Renal:   - no current issues, monitor I/O    ID:   - no current issues, WBC 6, afebrile    Heme:   - monitor H/H, stable at 9.3/29 currently, keep active T&S  - last transfusion 5/27    PT/OT  DVT ppx   dispo planning pending medical stability

## 2019-05-29 LAB
ANION GAP SERPL CALC-SCNC: 11 MMOL/L — SIGNIFICANT CHANGE UP (ref 5–17)
BASOPHILS # BLD AUTO: 0.01 K/UL — SIGNIFICANT CHANGE UP (ref 0–0.2)
BASOPHILS NFR BLD AUTO: 0.2 % — SIGNIFICANT CHANGE UP (ref 0–2)
BUN SERPL-MCNC: 9 MG/DL — SIGNIFICANT CHANGE UP (ref 7–23)
CALCIUM SERPL-MCNC: 8.4 MG/DL — SIGNIFICANT CHANGE UP (ref 8.4–10.5)
CHLORIDE SERPL-SCNC: 105 MMOL/L — SIGNIFICANT CHANGE UP (ref 96–108)
CO2 SERPL-SCNC: 25 MMOL/L — SIGNIFICANT CHANGE UP (ref 22–31)
CREAT SERPL-MCNC: 0.47 MG/DL — LOW (ref 0.5–1.3)
EOSINOPHIL # BLD AUTO: 0.09 K/UL — SIGNIFICANT CHANGE UP (ref 0–0.5)
EOSINOPHIL NFR BLD AUTO: 1.6 % — SIGNIFICANT CHANGE UP (ref 0–6)
GLUCOSE SERPL-MCNC: 132 MG/DL — HIGH (ref 70–99)
HCT VFR BLD CALC: 28 % — LOW (ref 34.5–45)
HGB BLD-MCNC: 8.9 G/DL — LOW (ref 11.5–15.5)
IMM GRANULOCYTES NFR BLD AUTO: 0.2 % — SIGNIFICANT CHANGE UP (ref 0–1.5)
LYMPHOCYTES # BLD AUTO: 0.66 K/UL — LOW (ref 1–3.3)
LYMPHOCYTES # BLD AUTO: 11.7 % — LOW (ref 13–44)
MAGNESIUM SERPL-MCNC: 2.2 MG/DL — SIGNIFICANT CHANGE UP (ref 1.6–2.6)
MCHC RBC-ENTMCNC: 30.2 PG — SIGNIFICANT CHANGE UP (ref 27–34)
MCHC RBC-ENTMCNC: 31.8 GM/DL — LOW (ref 32–36)
MCV RBC AUTO: 94.9 FL — SIGNIFICANT CHANGE UP (ref 80–100)
MONOCYTES # BLD AUTO: 0.46 K/UL — SIGNIFICANT CHANGE UP (ref 0–0.9)
MONOCYTES NFR BLD AUTO: 8.1 % — SIGNIFICANT CHANGE UP (ref 2–14)
NEUTROPHILS # BLD AUTO: 4.42 K/UL — SIGNIFICANT CHANGE UP (ref 1.8–7.4)
NEUTROPHILS NFR BLD AUTO: 78.2 % — HIGH (ref 43–77)
NRBC # BLD: 0 /100 WBCS — SIGNIFICANT CHANGE UP (ref 0–0)
PHOSPHATE SERPL-MCNC: 4 MG/DL — SIGNIFICANT CHANGE UP (ref 2.5–4.5)
PLATELET # BLD AUTO: 136 K/UL — LOW (ref 150–400)
POTASSIUM SERPL-MCNC: 3.7 MMOL/L — SIGNIFICANT CHANGE UP (ref 3.5–5.3)
POTASSIUM SERPL-SCNC: 3.7 MMOL/L — SIGNIFICANT CHANGE UP (ref 3.5–5.3)
RBC # BLD: 2.95 M/UL — LOW (ref 3.8–5.2)
RBC # FLD: 15.6 % — HIGH (ref 10.3–14.5)
SODIUM SERPL-SCNC: 141 MMOL/L — SIGNIFICANT CHANGE UP (ref 135–145)
WBC # BLD: 5.65 K/UL — SIGNIFICANT CHANGE UP (ref 3.8–10.5)
WBC # FLD AUTO: 5.65 K/UL — SIGNIFICANT CHANGE UP (ref 3.8–10.5)

## 2019-05-29 PROCEDURE — 74241: CPT | Mod: 26

## 2019-05-29 PROCEDURE — 71045 X-RAY EXAM CHEST 1 VIEW: CPT | Mod: 26

## 2019-05-29 RX ADMIN — SODIUM CHLORIDE 3 MILLILITER(S): 9 INJECTION INTRAMUSCULAR; INTRAVENOUS; SUBCUTANEOUS at 07:01

## 2019-05-29 RX ADMIN — SODIUM CHLORIDE 3 MILLILITER(S): 9 INJECTION INTRAMUSCULAR; INTRAVENOUS; SUBCUTANEOUS at 21:27

## 2019-05-29 RX ADMIN — HEPARIN SODIUM 5000 UNIT(S): 5000 INJECTION INTRAVENOUS; SUBCUTANEOUS at 21:32

## 2019-05-29 RX ADMIN — HEPARIN SODIUM 5000 UNIT(S): 5000 INJECTION INTRAVENOUS; SUBCUTANEOUS at 07:09

## 2019-05-29 RX ADMIN — HEPARIN SODIUM 5000 UNIT(S): 5000 INJECTION INTRAVENOUS; SUBCUTANEOUS at 14:46

## 2019-05-29 RX ADMIN — SODIUM CHLORIDE 3 MILLILITER(S): 9 INJECTION INTRAMUSCULAR; INTRAVENOUS; SUBCUTANEOUS at 14:20

## 2019-05-29 NOTE — PROGRESS NOTE ADULT - ASSESSMENT
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 Colonoscopy on 01/11/19 removed 2-4 mm polyps. EGD 01/12/19 showed a gastric mass with ulceration. Pathology revealed poorly differentiated adenocarcinoma. Currently s/p lap total gastrectomy with Mary Ann-n-Y reconstruction and J-tube placement on 5/24/19 with Dr. Flores. Post op transferred to CTICU, extubated. Overnight had oozing around JAYSON site, given PRBCs. On POD 2 trophic feeds started, pt tolerated well, and now uptitrated to 20cc/hr. On POD 3, given 1U PRBCs for hgb 7.5 with appropriate response. Now POD 5 stable on 9Lachman. NGT removed today. Awaiting barium esophagram today.     Neuro:   - Pain controlled on ketorolac 15 mg IV q 6 hours prn.    Cards:   - Hemodynamically stable. No cardiac meds.     Resp:   - Encouraged ambulation and use of IS 5x/hour.     GI:   - NGT removed 5/29/19.   - Continue J tube flush q 4 hours. Cont feeds, uptitrate per Dr. Flores/ as patient tolerates toward goal  - Awaiting esophagram today. Plan to advance diet to clear liquids pending results of esophogram.  - Continue to monitor JAYSON drainage.    Renal:   - No current issues, monitor I/O and lytes.    ID:   - No current issues, WBC 5.65, afebrile    Heme:   - Monitor H/H, stable at 8.9/28 currently, keep active T&S  - Last transfusion 5/27    DVT Prophylaxis:  - Heparin 5000U SQ q8hrs  - SCDs    Disposition:  -Pending results of barium esophagram. 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 Colonoscopy on 01/11/19 removed 2-4 mm polyps. EGD 01/12/19 showed a gastric mass with ulceration. Pathology revealed poorly differentiated adenocarcinoma. Currently s/p lap total gastrectomy with Mary Ann-n-Y reconstruction and J-tube placement on 5/24/19 with Dr. Flores. Post op transferred to CTICU, extubated. Overnight had oozing around JAYSON site, given PRBCs. On POD 2 trophic feeds started, pt tolerated well, and now uptitrated to 20cc/hr. On POD 3, given 1U PRBCs for hgb 7.5 with appropriate response. Now POD 5 stable on 9Lachman. NGT removed today. Awaiting barium esophagram today.     Neuro:   - Pain controlled on ketorolac 15 mg IV q 6 hours prn.    Cards:   - Hemodynamically stable. No cardiac meds.     Resp:   - Encouraged ambulation and use of IS 5x/hour.     GI:   - NGT removed 5/29/19.   - Continue J tube flush q 4 hours. Cont feeds, uptitrate per Dr. Flores/ as patient tolerates toward goal  - Awaiting esophagram today. Plan to advance diet to clear liquids pending results of esophogram.  - Continue to monitor JAYSON drainage.    Renal:   - No current issues, monitor I/O and lytes.    ID:   - No current issues, WBC 5.65, afebrile    Heme:   - Monitor H/H, stable at 8.9/28 currently, keep active T&S  - Last transfusion 5/27    DVT Prophylaxis:  - Heparin 5000U SQ q8hrs  - SCDs    Disposition:  -Pending results of barium esophagram.    *Patient seen by myself, agree with history, physical exam and plan* Enzo WATERS. 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 Colonoscopy on 01/11/19 removed 2-4 mm polyps. EGD 01/12/19 showed a gastric mass with ulceration. Pathology revealed poorly differentiated adenocarcinoma. Currently s/p lap total gastrectomy with Mary Ann-n-Y reconstruction and J-tube placement on 5/24/19 with Dr. Flores.  Post op transferred to CTICU, extubated. Overnight had oozing around JAYSON site, given PRBCs. On POD 2 trophic feeds started, pt tolerated well, and now up-titrated to 20cc/hr.  On POD 3, given 1U PRBCs for hgb 7.5 with appropriate response. Now POD 5 stable on 9Lachman. NGT removed today. Awaiting barium esophagram today.     Neuro:   - Pain controlled on ketorolac 15 mg IV q 6 hours prn.    Cards:   - Hemodynamically stable. No cardiac meds.     Resp:   - Encouraged ambulation and use of IS 5x/hour.     GI:   - NGT removed 5/29/19.   - Continue J tube flush q 4 hours. Cont feeds, uptitrate per Dr. Flores/ as patient tolerates toward goal  - Awaiting esophagram today. Plan to advance diet to clear liquids pending results of esophogram.  - Continue to monitor JAYSON drainage.    Renal:   - No current issues, monitor I/O and lytes.    ID:   - No current issues, WBC 5.65, afebrile    Heme:   - Monitor H/H, stable at 8.9/28 currently, keep active T&S  - Last transfusion 5/27    DVT Prophylaxis:  - Heparin 5000U SQ q8hrs  - SCDs    Disposition:  -Pending results of barium esophagram.    *Patient seen by myself, agree with history, physical exam and plan* Enzo WATERS. 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 Colonoscopy on 01/11/19 removed 2-4 mm polyps. EGD 01/12/19 showed a gastric mass with ulceration. Pathology revealed poorly differentiated adenocarcinoma. Currently s/p lap total gastrectomy with Mary Ann-n-Y reconstruction and J-tube placement on 5/24/19 with Dr. Flores.  Post op transferred to CTICU, extubated. Overnight had oozing around JAYSON site, given PRBCs. On POD 2 trophic feeds started, pt tolerated well, and now up-titrated to 20cc/hr.  On POD 3, given 1U PRBCs for hgb 7.5 with appropriate response. Now POD 5 stable on 9Lachman. NGT removed today as discussed on rounds.  Awaiting barium esophagram today.      Neuro:   - Pain controlled on ketorolac 15 mg IV q 6 hours prn.  No new focal deficits.     Cards:   - Hemodynamically stable. No cardiac meds.     Resp:   - Encouraged ambulation and use of IS 5x/hour.   - CXR stable and reviewed on am rounds.      GI:   - NGT removed 5/29/19.  - Continue J tube flush q 4 hours. Cont feeds, uptitrate per Dr. Flores/ as patient tolerates toward goal.  Goal tube feeds established per Nutrition 47cc/hr.    - Awaiting esophagram today. Plan to advance diet to clear liquids pending results of esophogram.  - Continue to monitor JAYSON drainage.    Renal:   - No current issues, monitor I/O and lytes.  - Creatinine stable, continue to watch on Toradol.     ID:   - No current issues, WBC 5.65, afebrile    Heme:   - Monitor H/H, stable at 8.9/28 currently, keep active T&S  - Last transfusion 5/27/19    DVT Prophylaxis:  - Heparin 5000U SQ q8hrs  - SCDs    Disposition:  -Pending results of barium esophagram.    *Patient seen by myself, agree with history, physical exam and plan* Enzo WATERS.

## 2019-05-29 NOTE — DIETITIAN INITIAL EVALUATION ADULT. - OTHER INFO
Statement Selected 64y/o F with stage IIB gastric adenoCA s/p chemotherapy. Now s/p EGD and Lap RA total gastrectomy with Mary Ann-en-Y reconstruction and J-tube placement. Pt seen resting in chair with family at bedside. Jevity 1.5 infusing via J-tube at ordered goal rate of 30ml. Pt reports TF tolerated well and denies GI distress and pain. RN confirms that TF is tolerated. PTA pt with improved intake after chemo, but son reports pt unable to tolerate animal proteins 2/2 smell and is more picky than usual. Denies taking ONS. Weight loss endorsed 2/2 catabolic illness and chemo; UBW ~125 which indicates ~6# wt loss over 1-2months. Son reports pt has always been thin, but she is noticeably smaller. Suspect moderate-severe PCM 2/2 decreased intake/tolerance and wt loss; please see chart note. Discussed EN as nutrition source and potential diet advancement pending esophagram today. Also discussed home EN and timing of feeds 2/2 son asking questions after meeting with CM today. TF recs provided to PA and order entered for verification. Recommend CLD + ensure clear TID pending esophagram as medically feasible. RD to follow per policy.

## 2019-05-29 NOTE — PROGRESS NOTE ADULT - SUBJECTIVE AND OBJECTIVE BOX
Patient discussed on morning rounds with Dr. Flores.     Operation / Date: Lap total gastrectomy with Mary Ann-n-Y reconstruction and J-tube placement on 5/24/19 with Dr. Flores.     SUBJECTIVE ASSESSMENT:  #   Patient seen and examined sitting in chair. Patient feels well at this time. Tolerating NPO with tube feeds. Reports she is using IS pulling 1250 cc and states she walked Awaiting removal of NGT and barium esophagram today.       Vital Signs Last 24 Hrs  T(C): 36.5 (29 May 2019 10:01), Max: 36.7 (29 May 2019 01:01)  T(F): 97.7 (29 May 2019 10:01), Max: 98 (29 May 2019 01:01)  HR: 90 (29 May 2019 08:42) (78 - 90)  BP: 111/55 (29 May 2019 08:42) (92/53 - 118/70)  BP(mean): 79 (29 May 2019 08:42) (69 - 89)  RR: 18 (29 May 2019 08:42) (18 - 20)  SpO2: 98% (29 May 2019 08:42) (95% - 98%)  I&O's Detail    28 May 2019 07:01  -  29 May 2019 07:00  --------------------------------------------------------  IN:    Enteral Tube Flush: 120 mL    Enteral Tube Flush: 120 mL    ns in tub fed  [id number]: 610 mL    sodium chloride 0.9%.: 1200 mL  Total IN: 2050 mL    OUT:    Bulb: 110 mL    Nasoenteral Tube: 120 mL    Voided: 825 mL  Total OUT: 1055 mL    Total NET: 995 mL      29 May 2019 07:01  -  29 May 2019 11:08  --------------------------------------------------------  IN:    ns in tub fed  [id number]: 30 mL    sodium chloride 0.9%.: 50 mL  Total IN: 80 mL    OUT:  Total OUT: 0 mL    Total NET: 80 mL          CHEST TUBE:  Yes/No. AIR LEAKS: Yes/No. Suction / H2O SEAL.   RANGEL DRAIN:  Yes/No.  EPICARDIAL WIRES: Yes/No.  TIE DOWNS: Yes/No.  LANDRY: Yes/No.    PHYSICAL EXAM:    General:     Neurological:    Cardiovascular:    Respiratory:    Gastrointestinal:    Extremities:    Vascular:    Incision Sites:    LABS:                        8.9    5.65  )-----------( 136      ( 29 May 2019 06:19 )             28.0       COUMADIN:  Yes/No. REASON: .        05-29    141  |  105  |  9   ----------------------------<  132<H>  3.7   |  25  |  0.47<L>    Ca    8.4      29 May 2019 06:19  Phos  4.0     05-29  Mg     2.2     05-29            MEDICATIONS  (STANDING):  heparin  Injectable 5000 Unit(s) SubCutaneous every 8 hours  sodium chloride 0.9% lock flush 3 milliLiter(s) IV Push every 8 hours  sodium chloride 0.9%. 1000 milliLiter(s) (50 mL/Hr) IV Continuous <Continuous>    MEDICATIONS  (PRN):  ketorolac   Injectable 15 milliGRAM(s) IV Push every 6 hours PRN Moderate Pain (4 - 6)        RADIOLOGY & ADDITIONAL TESTS: Patient discussed on morning rounds with Dr. Flores.     Operation / Date: Lap total gastrectomy with Mary Ann-n-Y reconstruction and J-tube placement on 5/24/19 with Dr. Flroes.     SUBJECTIVE ASSESSMENT:  #   Patient seen and examined sitting in chair. Patient feels well at this time. Tolerating NPO with tube feeds. Reports she is using IS pulling 1250 cc and states she walked in the dyson 2x yesterday and 1x this morning. Reports she has not had a BM since the surgery, but reports she is passing gas. Awaiting removal of NGT and barium esophagram today. Denies CP, SOB, abdominal pain, nausea, vomiting, or any other complaints at this time.       Vital Signs Last 24 Hrs  T(C): 36.5 (29 May 2019 10:01), Max: 36.7 (29 May 2019 01:01)  T(F): 97.7 (29 May 2019 10:01), Max: 98 (29 May 2019 01:01)  HR: 90 (29 May 2019 08:42) (78 - 90)  BP: 111/55 (29 May 2019 08:42) (92/53 - 118/70)  BP(mean): 79 (29 May 2019 08:42) (69 - 89)  RR: 18 (29 May 2019 08:42) (18 - 20)  SpO2: 98% (29 May 2019 08:42) (95% - 98%)  I&O's Detail    28 May 2019 07:01  -  29 May 2019 07:00  --------------------------------------------------------  IN:    Enteral Tube Flush: 120 mL    Enteral Tube Flush: 120 mL    ns in tub fed  [id number]: 610 mL    sodium chloride 0.9%.: 1200 mL  Total IN: 2050 mL    OUT:    Bulb: 110 mL    Nasoenteral Tube: 120 mL    Voided: 825 mL  Total OUT: 1055 mL    Total NET: 995 mL      29 May 2019 07:01  -  29 May 2019 11:08  --------------------------------------------------------  IN:    ns in tub fed  [id number]: 30 mL    sodium chloride 0.9%.: 50 mL  Total IN: 80 mL    OUT:  Total OUT: 0 mL    Total NET: 80 mL          CHEST TUBE:  No.  RANGEL DRAIN:  No.  EPICARDIAL WIRES: No.  TIE DOWNS: No.  LANDRY: No.    PHYSICAL EXAM:    General: Sitting in chair in no acute distress. Appears chronically ill with minimal hair.     Neurological: Alert and oriented. No gross focal neuro deficits.     Cardiovascular: RRR in the 90s, normal S1 and S2. No murmurs, gallops, or rubs.     Respiratory: Clear to auscultation bilaterally. Unlabored. No wheezes, rales or rhonchi.    Gastrointestinal: Soft, nontender.     Extremities:    Vascular:    Incision Sites:    LABS:                        8.9    5.65  )-----------( 136      ( 29 May 2019 06:19 )             28.0       COUMADIN:  Yes/No. REASON: .        05-29    141  |  105  |  9   ----------------------------<  132<H>  3.7   |  25  |  0.47<L>    Ca    8.4      29 May 2019 06:19  Phos  4.0     05-29  Mg     2.2     05-29            MEDICATIONS  (STANDING):  heparin  Injectable 5000 Unit(s) SubCutaneous every 8 hours  sodium chloride 0.9% lock flush 3 milliLiter(s) IV Push every 8 hours  sodium chloride 0.9%. 1000 milliLiter(s) (50 mL/Hr) IV Continuous <Continuous>    MEDICATIONS  (PRN):  ketorolac   Injectable 15 milliGRAM(s) IV Push every 6 hours PRN Moderate Pain (4 - 6)        RADIOLOGY & ADDITIONAL TESTS: Patient discussed on morning rounds with Dr. Flores.     Operation / Date: Total lap gastrectomy with Mary Ann-n-Y reconstruction and J-tube placement on 5/24/19 with Dr. Flores.     SUBJECTIVE ASSESSMENT:  # [id number]  Patient seen and examined sitting in chair. Patient feels well at this time. Tolerating NPO with tube feeds. Reports she is using IS pulling 1250 cc. States she walked in the dyson 2x yesterday and 1x this morning. Reports she has not had a BM since the surgery, but reports she is passing gas. Awaiting removal of NGT and barium esophagram today. Denies CP, SOB, abdominal pain, nausea, vomiting, or any other complaints at this time.       Vital Signs Last 24 Hrs  T(C): 36.5 (29 May 2019 10:01), Max: 36.7 (29 May 2019 01:01)  T(F): 97.7 (29 May 2019 10:01), Max: 98 (29 May 2019 01:01)  HR: 90 (29 May 2019 08:42) (78 - 90)  BP: 111/55 (29 May 2019 08:42) (92/53 - 118/70)  BP(mean): 79 (29 May 2019 08:42) (69 - 89)  RR: 18 (29 May 2019 08:42) (18 - 20)  SpO2: 98% (29 May 2019 08:42) (95% - 98%)  I&O's Detail    28 May 2019 07:01  -  29 May 2019 07:00  --------------------------------------------------------  IN:    Enteral Tube Flush: 120 mL    Enteral Tube Flush: 120 mL    ns in tub fed  [id number]: 610 mL    sodium chloride 0.9%.: 1200 mL  Total IN: 2050 mL    OUT:    Bulb: 110 mL    Nasoenteral Tube: 120 mL    Voided: 825 mL  Total OUT: 1055 mL    Total NET: 995 mL      29 May 2019 07:01  -  29 May 2019 11:08  --------------------------------------------------------  IN:    ns in tub fed  [id number]: 30 mL    sodium chloride 0.9%.: 50 mL  Total IN: 80 mL    OUT:  Total OUT: 0 mL    Total NET: 80 mL          CHEST TUBE:  No.  RANGEL DRAIN:  No. JAYSON drain in place RLQ.  EPICARDIAL WIRES: No.  TIE DOWNS: No.  LANDRY: No.    PHYSICAL EXAM:    General: Sitting in chair in no acute distress. Appears chronically ill with minimal hair.     Neurological: Alert and oriented. No gross focal neuro deficits.     Cardiovascular: RRR in the 90s, normal S1 and S2. No murmurs, gallops, or rubs.     Respiratory: Clear to auscultation bilaterally. Unlabored. No wheezes, rales or rhonchi.    Gastrointestinal: Soft, nontender. JAYSON drain RLQ in place with minimal serosanguinous output. J tube LLQ with stiches in place.    Extremities: Warm, pink. No edema.     Incision Sites: Lap total gastrectomy/Mary Ann-en-Y reconstruction sites CDI, no drainage.     LABS:                        8.9    5.65  )-----------( 136      ( 29 May 2019 06:19 )             28.0       COUMADIN: No.        05-29    141  |  105  |  9   ----------------------------<  132<H>  3.7   |  25  |  0.47<L>    Ca    8.4      29 May 2019 06:19  Phos  4.0     05-29  Mg     2.2     05-29            MEDICATIONS  (STANDING):  heparin  Injectable 5000 Unit(s) SubCutaneous every 8 hours  sodium chloride 0.9% lock flush 3 milliLiter(s) IV Push every 8 hours  sodium chloride 0.9%. 1000 milliLiter(s) (50 mL/Hr) IV Continuous <Continuous>    MEDICATIONS  (PRN):  ketorolac Injectable 15 milliGRAM(s) IV Push every 6 hours PRN Moderate Pain (4 - 6)        RADIOLOGY & ADDITIONAL TESTS:  < from: Xray Chest 1 View- PORTABLE-Routine (05.29.19 @ 06:21) >  Impression:    Similar appearance to prior exam 5/20/2019 with considerable subcutaneous   emphysema again noted. Nasogastric tube tip in proximal stomach and   sidehole at level of GE junction. No pneumothorax is identified. No lung   infiltrate or pleural effusion.    < end of copied text > Patient discussed on morning rounds with Dr. Flores.     Operation / Date: Total lap gastrectomy with Mary Ann-n-Y reconstruction and J-tube placement on 5/24/19 with Dr. Flores.     SUBJECTIVE ASSESSMENT:  # [id number]  Patient seen and examined sitting in chair. Patient feels well at this time. Tolerating tube feeds, remains NPO until swallow study today.  Reports she is using IS pulling 1250 cc (witnessed). States she walked in the dyson 2x yesterday and 1x this morning. Reports she has not had a BM since the surgery, but reports she is passing gas. Awaiting removal of NGT and barium esophagram today. Denies CP, SOB, abdominal pain, nausea, vomiting, or any other complaints at this time.       Vital Signs Last 24 Hrs  T(C): 36.5 (29 May 2019 10:01), Max: 36.7 (29 May 2019 01:01)  T(F): 97.7 (29 May 2019 10:01), Max: 98 (29 May 2019 01:01)  HR: 90 (29 May 2019 08:42) (78 - 90)  BP: 111/55 (29 May 2019 08:42) (92/53 - 118/70)  BP(mean): 79 (29 May 2019 08:42) (69 - 89)  RR: 18 (29 May 2019 08:42) (18 - 20)  SpO2: 98% (29 May 2019 08:42) (95% - 98%)  I&O's Detail    28 May 2019 07:01  -  29 May 2019 07:00  --------------------------------------------------------  IN:    Enteral Tube Flush: 120 mL    Enteral Tube Flush: 120 mL    ns in tub fed  [id number]: 610 mL    sodium chloride 0.9%.: 1200 mL  Total IN: 2050 mL    OUT:    Bulb: 110 mL    Nasoenteral Tube: 120 mL    Voided: 825 mL  Total OUT: 1055 mL    Total NET: 995 mL      29 May 2019 07:01  -  29 May 2019 11:08  --------------------------------------------------------  IN:    ns in tub fed  [id number]: 30 mL    sodium chloride 0.9%.: 50 mL  Total IN: 80 mL    OUT:  Total OUT: 0 mL    Total NET: 80 mL      CHEST TUBE:  No.  RANGEL DRAIN:  No. JAYSON drain in place RLQ.  EPICARDIAL WIRES: No.  TIE DOWNS: No.  LANDRY: No.    PHYSICAL EXAM:    General: Sitting in chair in no acute distress. Appears chronically ill with recent hair loss (post chemotherapy).     Neurological: Alert and oriented. No gross focal neuro deficits.     Cardiovascular: RRR in the 90s, normal S1 and S2. No murmurs, gallops, or rubs.     Respiratory: Clear to auscultation bilaterally. Unlabored. No wheezes, rales or rhonchi.    Gastrointestinal: Soft, nontender. JAYSON drain RLQ in place with minimal serosanguinous output. J tube LLQ with stiches in place.    Extremities: Warm, pink. No edema.     Incision Sites: Lap total gastrectomy/Mary Ann-en-Y reconstruction sites CDI, no drainage.     LABS:                        8.9    5.65  )-----------( 136      ( 29 May 2019 06:19 )             28.0       COUMADIN: No.        05-29    141  |  105  |  9   ----------------------------<  132<H>  3.7   |  25  |  0.47<L>    Ca    8.4      29 May 2019 06:19  Phos  4.0     05-29  Mg     2.2     05-29            MEDICATIONS  (STANDING):  heparin  Injectable 5000 Unit(s) SubCutaneous every 8 hours  sodium chloride 0.9% lock flush 3 milliLiter(s) IV Push every 8 hours  sodium chloride 0.9%. 1000 milliLiter(s) (50 mL/Hr) IV Continuous <Continuous>    MEDICATIONS  (PRN):  ketorolac Injectable 15 milliGRAM(s) IV Push every 6 hours PRN Moderate Pain (4 - 6)      RADIOLOGY & ADDITIONAL TESTS:  < from: Xray Chest 1 View- PORTABLE-Routine (05.29.19 @ 06:21) >  Impression:    Similar appearance to prior exam 5/20/2019 with considerable subcutaneous   emphysema again noted. Nasogastric tube tip in proximal stomach and   sidehole at level of GE junction. No pneumothorax is identified. No lung   infiltrate or pleural effusion.    < end of copied text >

## 2019-05-29 NOTE — DIETITIAN INITIAL EVALUATION ADULT. - ORAL INTAKE PTA
poor intake during chemo, would mainly take liquids. Chemo ended 4/3/19 with improved appetite and intake./fair

## 2019-05-29 NOTE — DIETITIAN INITIAL EVALUATION ADULT. - NS AS NUTRI INTERV ENTERAL NUTRITION
Rate/Composition/Route Jevity 1.5 via J-tube with goal rate of 47ml/hr x 24hr (1128ml TV, 1692kcal, 72g pro, 857ml water, 112% of RDIs). Additional fluids per team or ~900ml/day. Monitor for s/s of intolerance and maintain aspiration precautions. Continue to advance to goal. RD to follow and adjust EN pending po adequacy if oral diet started./Composition/Rate/Route

## 2019-05-30 LAB
ANION GAP SERPL CALC-SCNC: 11 MMOL/L — SIGNIFICANT CHANGE UP (ref 5–17)
BUN SERPL-MCNC: 11 MG/DL — SIGNIFICANT CHANGE UP (ref 7–23)
CALCIUM SERPL-MCNC: 8.9 MG/DL — SIGNIFICANT CHANGE UP (ref 8.4–10.5)
CHLORIDE SERPL-SCNC: 106 MMOL/L — SIGNIFICANT CHANGE UP (ref 96–108)
CO2 SERPL-SCNC: 24 MMOL/L — SIGNIFICANT CHANGE UP (ref 22–31)
CREAT SERPL-MCNC: 0.49 MG/DL — LOW (ref 0.5–1.3)
GLUCOSE SERPL-MCNC: 105 MG/DL — HIGH (ref 70–99)
HCT VFR BLD CALC: 30.7 % — LOW (ref 34.5–45)
HGB BLD-MCNC: 9.7 G/DL — LOW (ref 11.5–15.5)
MAGNESIUM SERPL-MCNC: 2.4 MG/DL — SIGNIFICANT CHANGE UP (ref 1.6–2.6)
MCHC RBC-ENTMCNC: 30.7 PG — SIGNIFICANT CHANGE UP (ref 27–34)
MCHC RBC-ENTMCNC: 31.6 GM/DL — LOW (ref 32–36)
MCV RBC AUTO: 97.2 FL — SIGNIFICANT CHANGE UP (ref 80–100)
NRBC # BLD: 0 /100 WBCS — SIGNIFICANT CHANGE UP (ref 0–0)
PLATELET # BLD AUTO: 172 K/UL — SIGNIFICANT CHANGE UP (ref 150–400)
POTASSIUM SERPL-MCNC: 3.6 MMOL/L — SIGNIFICANT CHANGE UP (ref 3.5–5.3)
POTASSIUM SERPL-SCNC: 3.6 MMOL/L — SIGNIFICANT CHANGE UP (ref 3.5–5.3)
RBC # BLD: 3.16 M/UL — LOW (ref 3.8–5.2)
RBC # FLD: 15.3 % — HIGH (ref 10.3–14.5)
SODIUM SERPL-SCNC: 141 MMOL/L — SIGNIFICANT CHANGE UP (ref 135–145)
WBC # BLD: 3.85 K/UL — SIGNIFICANT CHANGE UP (ref 3.8–10.5)
WBC # FLD AUTO: 3.85 K/UL — SIGNIFICANT CHANGE UP (ref 3.8–10.5)

## 2019-05-30 PROCEDURE — 71045 X-RAY EXAM CHEST 1 VIEW: CPT | Mod: 26

## 2019-05-30 RX ADMIN — SODIUM CHLORIDE 50 MILLILITER(S): 9 INJECTION INTRAMUSCULAR; INTRAVENOUS; SUBCUTANEOUS at 06:07

## 2019-05-30 RX ADMIN — SODIUM CHLORIDE 3 MILLILITER(S): 9 INJECTION INTRAMUSCULAR; INTRAVENOUS; SUBCUTANEOUS at 21:21

## 2019-05-30 RX ADMIN — SODIUM CHLORIDE 3 MILLILITER(S): 9 INJECTION INTRAMUSCULAR; INTRAVENOUS; SUBCUTANEOUS at 13:42

## 2019-05-30 RX ADMIN — SODIUM CHLORIDE 3 MILLILITER(S): 9 INJECTION INTRAMUSCULAR; INTRAVENOUS; SUBCUTANEOUS at 06:08

## 2019-05-30 RX ADMIN — HEPARIN SODIUM 5000 UNIT(S): 5000 INJECTION INTRAVENOUS; SUBCUTANEOUS at 13:42

## 2019-05-30 RX ADMIN — HEPARIN SODIUM 5000 UNIT(S): 5000 INJECTION INTRAVENOUS; SUBCUTANEOUS at 22:16

## 2019-05-30 RX ADMIN — HEPARIN SODIUM 5000 UNIT(S): 5000 INJECTION INTRAVENOUS; SUBCUTANEOUS at 06:07

## 2019-05-30 NOTE — PROGRESS NOTE ADULT - ASSESSMENT
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 Colonoscopy on 01/11/19 removed 2-4 mm polyps. EGD 01/12/19 showed a gastric mass with ulceration. Pathology revealed poorly differentiated adenocarcinoma. Currently s/p lap total gastrectomy with Mary Ann-n-Y reconstruction and J-tube placement on 5/24/19 with Dr. Flores.  Post op transferred to CTICU, extubated. Overnight had oozing around JAYSON site, given PRBCs. On POD 2 trophic feeds started, pt tolerated well, and now up-titrated to 20cc/hr.  On POD 3, given 1U PRBCs for hgb 7.5 with appropriate response. Now POD 5 stable on 9Lachman. NGT removed today as discussed on rounds.  Awaiting barium esophagram today.      Neuro:   - Pain controlled on ketorolac 15 mg IV q 6 hours prn.  No new focal deficits.     Cards:   - Hemodynamically stable. No cardiac meds.     Resp:   - Encouraged ambulation and use of IS 5x/hour.   - CXR stable and reviewed on am rounds.      GI:   - Continue J tube flush q 4 hours.   - tolerating clears  - Cont feeds, changed to cyclic night feeds with goal 80cc/h for 14 ours from 6PM to 8AM.    - Continue to monitor JAYSON drainage now that patient taking PO, only drained 15cc for 24 hours    Renal:   - No current issues, monitor I/O and lytes.  - Creatinine stable, continue to watch on Toradol.     ID:   - No current issues, WBC 3.85, afebrile    Heme:   - Monitor H/H, stable at 9.7/30.7 currently, keep active T&S  - Last transfusion 5/27/19    DVT Prophylaxis:  - Heparin 5000U SQ q8hrs  - SCDs    Disposition:  -Home tomorrow if tolerating clears and night feeds

## 2019-05-30 NOTE — PROGRESS NOTE ADULT - SUBJECTIVE AND OBJECTIVE BOX
Patient discussed on morning rounds with Dr. Luo     Operation / Date: 5/24/19 EGD lap RA total gastrectomy with Mary Ann-en-Y reconstruction and J-tube placement    SUBJECTIVE ASSESSMENT:    Pain well controlled, denies dyspnea/SOB, fevers/chills, N/V.  Tolerating clear liquid diet.    Vital Signs Last 24 Hrs  T(C): 36.7 (30 May 2019 13:58), Max: 36.9 (30 May 2019 05:01)  T(F): 98 (30 May 2019 13:58), Max: 98.4 (30 May 2019 05:01)  HR: 78 (30 May 2019 13:30) (76 - 82)  BP: 113/61 (30 May 2019 13:30) (88/57 - 113/61)  BP(mean): 82 (30 May 2019 13:30) (67 - 82)  RR: 18 (30 May 2019 13:30) (17 - 19)  SpO2: 98% (30 May 2019 13:30) (97% - 98%)  I&O's Detail    29 May 2019 07:01  -  30 May 2019 07:00  --------------------------------------------------------  IN:    Enteral Tube Flush: 100 mL    ns in tub fed  [id number]: 934 mL    sodium chloride 0.9%.: 1200 mL  Total IN: 2234 mL    OUT:    Bulb: 50 mL    Voided: 200 mL  Total OUT: 250 mL    Total NET: 1984 mL    30 May 2019 07:01  -  30 May 2019 15:12  --------------------------------------------------------  IN:    ns in tub fed  [id number]: 144 mL    Oral Fluid: 200 mL    sodium chloride 0.9%.: 150 mL  Total IN: 494 mL    OUT:    Bulb: 15 mL  Total OUT: 15 mL    Total NET: 479 mL    CHEST TUBE:  No.  RANGEL DRAIN:  No. JAYSON drain in place RLQ.  EPICARDIAL WIRES: No.  TIE DOWNS: No.  LANDRY: No.    PHYSICAL EXAM:    General: Sitting in chair in no acute distress. Appears chronically ill with recent hair loss (post chemotherapy).     Neurological: Alert and oriented. No gross focal neuro deficits.     Cardiovascular: RRR in the 90s, normal S1 and S2. No murmurs, gallops, or rubs.     Respiratory: Clear to auscultation bilaterally. Unlabored. No wheezes, rales or rhonchi.    Gastrointestinal: Soft, nontender. JAYSON drain RLQ in place with minimal serosanguinous output. J tube LLQ with stiches in place.    Extremities: Warm, pink. No edema.     Incision Sites: Lap total gastrectomy/Mary Ann-en-Y reconstruction sites CDI, no drainage.     LABS:                        9.7    3.85  )-----------( 172      ( 30 May 2019 05:51 )             30.7       COUMADIN:  No.     05-30    141  |  106  |  11  ----------------------------<  105<H>  3.6   |  24  |  0.49<L>    Ca    8.9      30 May 2019 05:51  Phos  4.0     05-29  Mg     2.4     05-30            MEDICATIONS  (STANDING):  heparin  Injectable 5000 Unit(s) SubCutaneous every 8 hours  sodium chloride 0.9% lock flush 3 milliLiter(s) IV Push every 8 hours  sodium chloride 0.9%. 1000 milliLiter(s) (50 mL/Hr) IV Continuous <Continuous>    MEDICATIONS  (PRN):        RADIOLOGY & ADDITIONAL TESTS:

## 2019-05-31 ENCOUNTER — TRANSCRIPTION ENCOUNTER (OUTPATIENT)
Age: 64
End: 2019-05-31

## 2019-05-31 VITALS
DIASTOLIC BLOOD PRESSURE: 60 MMHG | HEART RATE: 80 BPM | OXYGEN SATURATION: 98 % | SYSTOLIC BLOOD PRESSURE: 104 MMHG | RESPIRATION RATE: 17 BRPM

## 2019-05-31 LAB
ANION GAP SERPL CALC-SCNC: 9 MMOL/L — SIGNIFICANT CHANGE UP (ref 5–17)
BUN SERPL-MCNC: 10 MG/DL — SIGNIFICANT CHANGE UP (ref 7–23)
CALCIUM SERPL-MCNC: 8.8 MG/DL — SIGNIFICANT CHANGE UP (ref 8.4–10.5)
CHLORIDE SERPL-SCNC: 106 MMOL/L — SIGNIFICANT CHANGE UP (ref 96–108)
CO2 SERPL-SCNC: 26 MMOL/L — SIGNIFICANT CHANGE UP (ref 22–31)
CREAT SERPL-MCNC: 0.52 MG/DL — SIGNIFICANT CHANGE UP (ref 0.5–1.3)
GLUCOSE SERPL-MCNC: 103 MG/DL — HIGH (ref 70–99)
HCT VFR BLD CALC: 30.2 % — LOW (ref 34.5–45)
HGB BLD-MCNC: 9.3 G/DL — LOW (ref 11.5–15.5)
MAGNESIUM SERPL-MCNC: 2.3 MG/DL — SIGNIFICANT CHANGE UP (ref 1.6–2.6)
MCHC RBC-ENTMCNC: 29.7 PG — SIGNIFICANT CHANGE UP (ref 27–34)
MCHC RBC-ENTMCNC: 30.8 GM/DL — LOW (ref 32–36)
MCV RBC AUTO: 96.5 FL — SIGNIFICANT CHANGE UP (ref 80–100)
NRBC # BLD: 0 /100 WBCS — SIGNIFICANT CHANGE UP (ref 0–0)
PLATELET # BLD AUTO: 184 K/UL — SIGNIFICANT CHANGE UP (ref 150–400)
POTASSIUM SERPL-MCNC: 4.8 MMOL/L — SIGNIFICANT CHANGE UP (ref 3.5–5.3)
POTASSIUM SERPL-SCNC: 4.8 MMOL/L — SIGNIFICANT CHANGE UP (ref 3.5–5.3)
RBC # BLD: 3.13 M/UL — LOW (ref 3.8–5.2)
RBC # FLD: 15.1 % — HIGH (ref 10.3–14.5)
SODIUM SERPL-SCNC: 141 MMOL/L — SIGNIFICANT CHANGE UP (ref 135–145)
SURGICAL PATHOLOGY STUDY: SIGNIFICANT CHANGE UP
WBC # BLD: 4.09 K/UL — SIGNIFICANT CHANGE UP (ref 3.8–10.5)
WBC # FLD AUTO: 4.09 K/UL — SIGNIFICANT CHANGE UP (ref 3.8–10.5)

## 2019-05-31 RX ORDER — SENNA PLUS 8.6 MG/1
2 TABLET ORAL
Qty: 28 | Refills: 0
Start: 2019-05-31 | End: 2019-06-13

## 2019-05-31 RX ORDER — ACETAMINOPHEN 500 MG
2 TABLET ORAL
Qty: 112 | Refills: 0
Start: 2019-05-31 | End: 2019-06-13

## 2019-05-31 RX ORDER — DOCUSATE SODIUM 100 MG
1 CAPSULE ORAL
Qty: 42 | Refills: 0
Start: 2019-05-31 | End: 2019-06-13

## 2019-05-31 RX ADMIN — HEPARIN SODIUM 5000 UNIT(S): 5000 INJECTION INTRAVENOUS; SUBCUTANEOUS at 05:38

## 2019-05-31 RX ADMIN — SODIUM CHLORIDE 3 MILLILITER(S): 9 INJECTION INTRAMUSCULAR; INTRAVENOUS; SUBCUTANEOUS at 05:31

## 2019-05-31 NOTE — DISCHARGE NOTE PROVIDER - NSDCFUADDAPPT_GEN_ALL_CORE_FT
-Please follow up with Dr. Flores on 6/7/19 at 12:00pm.  The office is located at [facility], [facility], 4th floor. Call us with any questions, #[phone].  -Please follow up with Dr. Sonia Daniels (referring MD) on 6/10/2019 at 9am. Address: [address] [place] [address]. Phone: [phone]

## 2019-05-31 NOTE — DISCHARGE NOTE PROVIDER - NSDCFUADDINST_GEN_ALL_CORE_FT
-Please continue your tube feeds at night as directed. If you have any abdominal pain, nausea or vomiting, please call our office.  -Please continue a clear liquid diet until you see Dr. Flores in the office.   -Please keep the dressing where your drain was on for 48 hours after discharge. Then you can remove it and keep it open to air.  -The staples at your incision site will be removed at follow up with Dr. Flores.   -Walk daily as tolerated and use your incentive spirometer every hour.  -No driving or strenuous activity/exercise for 6 weeks, or until cleared by your surgeon.    -You may shower.  Be sure to gently clean your incisions with anti-bacterial soap and water daily, pat dry.  You may leave them open to air.    -Call your doctor if you have shortness of breath, chest pain not relieved by pain medication, dizziness, fever >101.5, or increased redness or drainage from incisions.

## 2019-05-31 NOTE — CHART NOTE - NSCHARTNOTEFT_GEN_A_CORE
Discussed with Dr. Flores, UGI series reviewed, okay to start clear diet.     Tomorrow, change to 16 hour tube feeds if patient remains asymptomatic.
Upon Nutritional Assessment by the Registered Dietitian your patient was determined to meet criteria / has evidence of the following diagnosis/diagnoses:          [ ]  Mild Protein Calorie Malnutrition        [X]  Moderate Protein Calorie Malnutrition        [ ] Severe Protein Calorie Malnutrition        [ ] Unspecified Protein Calorie Malnutrition        [ ] Underweight / BMI <19        [ ] Morbid Obesity / BMI > 40      Findings as based on:  •  Comprehensive nutrition assessment and consultation  •  Calorie counts (nutrient intake analysis)  •  Food acceptance and intake status from observations by staff  •  Follow up  •  Patient education  •  Intervention secondary to interdisciplinary rounds  •   concerns      Treatment:    The following diet has been recommended:  please refer to initial assessment     PROVIDER Section:     By signing this assessment you are acknowledging and agree with the diagnosis/diagnoses assigned by the Registered Dietitian    Comments:
Per Dr. Flores, right sided JAYSON drain removed at bedside, dressing placed, CDI. Patient tolerated procedure well.

## 2019-05-31 NOTE — DISCHARGE NOTE NURSING/CASE MANAGEMENT/SOCIAL WORK - NSDCPETBCESMAN_GEN_ALL_CORE
PROCEDURE DATE:  09/28/18



PREOPERATIVE DIAGNOSIS:  External hemorrhoid.



POSTOPERATIVE DIAGNOSIS:  External hemorrhoid.



PROCEDURE:  External hemorrhoidectomy.



SURGEON:  Manish Sousa MD



ASSISTANT:  Martin Donis DO.



TYPE OF ANESTHESIA:  General with endotracheal intubation.



ANESTHESIOLOGIST:  Dr. Serrano.



IV FLUIDS:  Crystalloids.



ESTIMATED BLOOD LOSS:  1 mL.



INTRAOPERATIVE FINDINGS:  External hemorrhoid at 7 o'clock position as well

as 2 columns of the internal hemorrhoids.



SPECIMEN:  External hemorrhoid.



BRIEF HISTORY:  Mr. Kulkarni is a very pleasant 65-year-old gentleman who

presented to my office complaining of hemorrhoids that occasionally will

swell up and give him pain so he wish for the hemorrhoid to be excised. 

All the risks and benefits of the procedure were explained to the patient

and with the patient having a full understanding of all the risks and

benefits involved, informed consent was obtained and the patient was taken

to the operating for above-stated procedure.



DESCRIPTION OF PROCEDURE:  The patient was brought into the operating room

and was intubated in a stretcher and subsequent to that was placed in a

jackknife position.  Once this was accomplished, the retraction of the

buttocks was obtained with white silk tape and subsequent to that the

patient's perianal area was prepped with Betadine and draped in a standard

surgical fashion.  Prior to the beginning of the procedure, the patient

received prophylactic Ancef antibiotic.  Time-out was called in room and

everyone in the room were in agreement.  Using digital rectal examination,

the rectal vault was examined.  There appeared to be one external

hemorrhoid at 7 o'clock position, as well as 2 columns of engorged internal

hemorrhoids.  There appeared to be no palpable masses on digital rectal

examination and no gross blood.  At this point in time, the external

hemorrhoid at 7 o'clock position was grabbed with a hemorrhoid clamp and

using #15 blade scalpel knife, the anoderm was incised and subsequent to

that using the electrical cautery, the hemorrhoid was transacted off and

passed off to the [facility] as a specimen.  At this point in time,

hemostasis was achieved with electrical cautery.  Subsequent to that, the

anoderm defect was closed with several interrupted 3-0 chromic sutures and

once this was accomplished and hemostasis was confirmed, the incision was

infiltrated with 2% lidocaine anesthetic.  Subsequent to that, the

patient's perianal area was washed and dried and the patient was flipped

back to the stretcher, extubated by the anesthesia team, and transferred to

the recovery room in a stable condition.  At the end of the procedure, all

instrument counts, needles and sponges were correct.







__________________________________________

Manish Sousa MD





DD:  09/28/2018 10:20:30

DT:  09/28/2018 11:59:43

Job # [id number]
If you are a smoker, it is important for your health to stop smoking. Please be aware that second hand smoke is also harmful.

## 2019-05-31 NOTE — DISCHARGE NOTE PROVIDER - NSDCHHNEEDSERVICE_GEN_ALL_CORE
Observation and assessment/Medication teaching and assessment/Teaching and training/Wound care and assessment

## 2019-05-31 NOTE — DISCHARGE NOTE PROVIDER - HOSPITAL COURSE
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 Colonoscopy on 01/11/19 removed 2-4 mm polyps. EGD 01/12/19 showed a gastric mass with ulceration. Pathology revealed poorly differentiated adenocarcinoma. On 5/24/19 patient underwent lap total gastrectomy with Mary Ann-n-Y reconstruction and J-tube placement with Dr. Flores.  Post op transferred to CTICU, extubated. Overnight had oozing around JAYSON site, given PRBCs. On POD 2 trophic feeds started.  On POD 3, given 1U PRBCs for hgb 7.5 with appropriate response.  POD4, remained stable, transferred to [facility]. POD 5 NGT removed, esophogram with no leak, started on clears. POD6, feeds at goal, tolerating clears. Today POD7, patient has no acute complaints. She is ambulating on RA, using IS pulling 1000cc, tolerating clear liquid diet and cyclic tube feeds and has had BM. Per Dr. Flores patient is ready for discharge. Patient given hard copy instructions on clear liquid diet.         35 minutes was spent with the patient reviewing the discharge material including medications, follow up appointments, recovery, concerning symptoms, and how to contact their health care providers if they have questions.

## 2019-05-31 NOTE — DISCHARGE NOTE NURSING/CASE MANAGEMENT/SOCIAL WORK - NSDCFUADDAPPT_GEN_ALL_CORE_FT
-Please follow up with Dr. Flores on 6/7/19 at 12:00pm.  The office is located at [facility], [facility], 4th floor. Call us with any questions, #[phone].  -Please follow up with Dr. Sonia Daniels (referring MD) on 6/10/2019 at 9am. Address: [address] [place] [address]. Phone: [phone]

## 2019-05-31 NOTE — DISCHARGE NOTE NURSING/CASE MANAGEMENT/SOCIAL WORK - NSDCDPATPORTLINK_GEN_ALL_CORE
You can access the [facility], offered by [facility], by registering with the following website: [email or web address]

## 2019-05-31 NOTE — PROGRESS NOTE ADULT - SUBJECTIVE AND OBJECTIVE BOX
Patient discussed on morning rounds with Dr. Flores    Operation / Date: Total lap gastrectomy with Mary Ann-n-Y reconstruction and J-tube placement on 5/24/19    Surgeon: Dr. Flores    Referring Physician: Dr. Sonia Daniels    SUBJECTIVE ASSESSMENT:  63y Female seen and examined. Son, Angely Pacheco, at bedside for interpretation. Feels well, ambulating on Ra, using IS pulling 1000cc, tolerating clear liquid diet and tube feeds, + BM. Denies fever, chest pain, palpitations SOB, abdominal pain, n/v. Understands that she needs to adhere to clear liquid diet until cleared by Dr. Flores.    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 Colonoscopy on 01/11/19 removed 2-4 mm polyps. EGD 01/12/19 showed a gastric mass with ulceration. Pathology revealed poorly differentiated adenocarcinoma. On 5/24/19 patient underwent lap total gastrectomy with Mary Ann-n-Y reconstruction and J-tube placement with Dr. Flores.  Post op transferred to CTICU, extubated. Overnight had oozing around JAYSON site, given PRBCs. On POD 2 trophic feeds started.  On POD 3, given 1U PRBCs for hgb 7.5 with appropriate response.  POD4, remained stable, transferred to [facility]. POD 5 NGT removed, esophogram with no leak, started on clears. POD6, feeds at goal, tolerating clears. Today POD7, patient has no acute complaints. She is ambulating on RA, using IS pulling 1000cc, tolerating clear liquid diet and cyclic tube feeds and has had BM. Per Dr. Flores patient is ready for discharge. Patient given hard copy instructions on clear liquid diet.     35 minutes was spent with the patient reviewing the discharge material including medications, follow up appointments, recovery, concerning symptoms, and how to contact their health care providers if they have questions.     Vital Signs Last 24 Hrs  T(C): 36.3 (31 May 2019 05:01), Max: 36.8 (30 May 2019 18:11)  T(F): 97.3 (31 May 2019 05:01), Max: 98.3 (30 May 2019 18:11)  HR: 84 (31 May 2019 08:28) (76 - 84)  BP: 99/55 (31 May 2019 08:28) (92/51 - 113/61)  BP(mean): 71 (31 May 2019 08:28) (66 - 82)  RR: 17 (31 May 2019 08:28) (17 - 19)  SpO2: 98% (31 May 2019 08:28) (97% - 98%)  I&O's Detail    30 May 2019 07:01  -  31 May 2019 07:00  --------------------------------------------------------  IN:    Enteral Tube Flush: 60 mL    ns in tub fed  [id number]: 924 mL    Oral Fluid: 550 mL    sodium chloride 0.9%: 150 mL  Total IN: 1684 mL    OUT:    Bulb: 35 mL  Total OUT: 35 mL    Total NET: 1649 mL      31 May 2019 07:01  -  31 May 2019 09:51  --------------------------------------------------------  IN:    Enteral Tube Flush: 20 mL    ns in tub fed  [id number]: 80 mL  Total IN: 100 mL    OUT:  Total OUT: 0 mL    Total NET: 100 mL      PHYSICAL EXAM:    General: Patient lying comfortably in bed, no acute distress     Neurological: Alert and oriented. No focal neurological deficits     Cardiovascular: S1S2, RRR, no murmurs appreciated on exam     Respiratory: Clear to ausculation bilaterally, no wheeze/rhonchi/rales    Gastrointestinal: + BS, soft, non tender, non distended. + J tube, no signs of infection/drainage.     Extremities: Warm and well perfused. No edema, no calf tenderness     Vascular: 2+ Peripheral pulses b/l     Incision Sites: JAYSON site: CDI.  + stables at incision site: CDI.     LABS:                        9.3    4.09  )-----------( 184      ( 31 May 2019 05:34 )             30.2       COUMADIN: No        05-31    141  |  106  |  10  ----------------------------<  103<H>  4.8   |  26  |  0.52    Ca    8.8      31 May 2019 05:34  Mg     2.3     05-31            MEDICATIONS  (STANDING):  SEE MED REC    Discharge CXR:  < from: Xray Chest 1 View- PORTABLE-Routine (05.30.19 @ 05:24) >  Impression:    The nasogastric tube has been removed compared to prior exam 5/29/2019.   No other change. Scattered subcutaneous emphysema again noted. Port   venous catheter were with tip in region of superior vena cava again   noted. No lung infiltrate or pleural effusion. No pneumothorax identified.    < end of copied text >

## 2019-05-31 NOTE — DISCHARGE NOTE PROVIDER - CARE PROVIDER_API CALL
Milton Flores (MD)  Surgery; Thoracic Surgery  [address] [place] [address], [place] [address]  Phone: [phone]  Fax: [phone]  Follow Up Time:     Sonia Daniels)  Gastroenterology  [address] [place] [address], Room [address]  [place] [address]  Phone: [phone]  Fax: [phone]  Follow Up Time:

## 2019-06-04 DIAGNOSIS — E44.0 MODERATE PROTEIN-CALORIE MALNUTRITION: ICD-10-CM

## 2019-06-04 DIAGNOSIS — R71.0 PRECIPITOUS DROP IN HEMATOCRIT: ICD-10-CM

## 2019-06-04 DIAGNOSIS — C16.5 MALIGNANT NEOPLASM OF LESSER CURVATURE OF STOMACH, UNSPECIFIED: ICD-10-CM

## 2019-06-04 DIAGNOSIS — T81.82XA EMPHYSEMA (SUBCUTANEOUS) RESULTING FROM A PROCEDURE, INITIAL ENCOUNTER: ICD-10-CM

## 2019-06-07 ENCOUNTER — APPOINTMENT (OUTPATIENT)
Dept: THORACIC SURGERY | Facility: CLINIC | Age: 64
End: 2019-06-07
Payer: MEDICAID

## 2019-06-07 VITALS
OXYGEN SATURATION: 98 % | HEART RATE: 83 BPM | BODY MASS INDEX: 18.32 KG/M2 | WEIGHT: 114 LBS | DIASTOLIC BLOOD PRESSURE: 61 MMHG | RESPIRATION RATE: 18 BRPM | SYSTOLIC BLOOD PRESSURE: 101 MMHG | HEIGHT: 66 IN

## 2019-06-07 PROCEDURE — 99024 POSTOP FOLLOW-UP VISIT: CPT

## 2019-06-21 ENCOUNTER — APPOINTMENT (OUTPATIENT)
Dept: THORACIC SURGERY | Facility: CLINIC | Age: 64
End: 2019-06-21
Payer: MEDICAID

## 2019-06-21 VITALS
HEART RATE: 71 BPM | RESPIRATION RATE: 18 BRPM | TEMPERATURE: 96.8 F | HEIGHT: 66 IN | SYSTOLIC BLOOD PRESSURE: 128 MMHG | WEIGHT: 118 LBS | DIASTOLIC BLOOD PRESSURE: 74 MMHG | BODY MASS INDEX: 18.96 KG/M2 | OXYGEN SATURATION: 97 %

## 2019-06-21 PROCEDURE — 99024 POSTOP FOLLOW-UP VISIT: CPT

## 2019-07-01 NOTE — PACU DISCHARGE NOTE - MENTAL STATUS: PATIENT PARTICIPATION
Problem: Patient Care Overview  Goal: Plan of Care Review  Outcome: Ongoing (interventions implemented as appropriate)   07/01/19 0313   Coping/Psychosocial   Plan of Care Reviewed With patient   OTHER   Outcome Summary A&O X 4. C/o abdominal pain, unrelieved by previous dose of morphine. See nursing note. O2 sat stable on 2.5 l of O2 per NC. VSS stable.    Plan of Care Review   Progress no change     Goal: Individualization and Mutuality  Outcome: Ongoing (interventions implemented as appropriate)    Goal: Discharge Needs Assessment  Outcome: Ongoing (interventions implemented as appropriate)    Goal: Interprofessional Rounds/Family Conf  Outcome: Ongoing (interventions implemented as appropriate)      Problem: Fall Risk (Adult)  Goal: Identify Related Risk Factors and Signs and Symptoms  Outcome: Ongoing (interventions implemented as appropriate)    Goal: Absence of Fall  Outcome: Ongoing (interventions implemented as appropriate)      Problem: Pain, Acute (Adult)  Goal: Identify Related Risk Factors and Signs and Symptoms  Outcome: Ongoing (interventions implemented as appropriate)    Goal: Acceptable Pain Control/Comfort Level  Outcome: Ongoing (interventions implemented as appropriate)      Problem: Nutrition, Imbalanced: Inadequate Oral Intake (Adult)  Goal: Identify Related Risk Factors and Signs and Symptoms  Outcome: Ongoing (interventions implemented as appropriate)    Goal: Improved Oral Intake  Outcome: Ongoing (interventions implemented as appropriate)    Goal: Prevent Further Weight Loss  Outcome: Ongoing (interventions implemented as appropriate)      Problem: Skin Injury Risk (Adult)  Goal: Identify Related Risk Factors and Signs and Symptoms  Outcome: Ongoing (interventions implemented as appropriate)    Goal: Skin Health and Integrity  Outcome: Ongoing (interventions implemented as appropriate)      Problem: Pain, Chronic (Adult)  Goal: Identify Related Risk Factors and Signs and Symptoms  Outcome:  Awake Ongoing (interventions implemented as appropriate)    Goal: Acceptable Pain/Comfort Level and Functional Ability  Outcome: Ongoing (interventions implemented as appropriate)

## 2019-07-10 PROCEDURE — 36430 TRANSFUSION BLD/BLD COMPNT: CPT

## 2019-07-10 PROCEDURE — 86900 BLOOD TYPING SEROLOGIC ABO: CPT

## 2019-07-10 PROCEDURE — 71045 X-RAY EXAM CHEST 1 VIEW: CPT

## 2019-07-10 PROCEDURE — P9016: CPT

## 2019-07-10 PROCEDURE — 97116 GAIT TRAINING THERAPY: CPT

## 2019-07-10 PROCEDURE — 85610 PROTHROMBIN TIME: CPT

## 2019-07-10 PROCEDURE — 88309 TISSUE EXAM BY PATHOLOGIST: CPT

## 2019-07-10 PROCEDURE — 88305 TISSUE EXAM BY PATHOLOGIST: CPT

## 2019-07-10 PROCEDURE — P9045: CPT

## 2019-07-10 PROCEDURE — 86850 RBC ANTIBODY SCREEN: CPT

## 2019-07-10 PROCEDURE — 86923 COMPATIBILITY TEST ELECTRIC: CPT

## 2019-07-10 PROCEDURE — 86901 BLOOD TYPING SEROLOGIC RH(D): CPT

## 2019-07-10 PROCEDURE — 80048 BASIC METABOLIC PNL TOTAL CA: CPT

## 2019-07-10 PROCEDURE — 36415 COLL VENOUS BLD VENIPUNCTURE: CPT

## 2019-07-10 PROCEDURE — 88331 PATH CONSLTJ SURG 1 BLK 1SPC: CPT

## 2019-07-10 PROCEDURE — 80053 COMPREHEN METABOLIC PANEL: CPT

## 2019-07-10 PROCEDURE — 84100 ASSAY OF PHOSPHORUS: CPT

## 2019-07-10 PROCEDURE — S2900: CPT

## 2019-07-10 PROCEDURE — 74241: CPT

## 2019-07-10 PROCEDURE — 85730 THROMBOPLASTIN TIME PARTIAL: CPT

## 2019-07-10 PROCEDURE — 88332 PATH CONSLTJ SURG EA ADD BLK: CPT

## 2019-07-10 PROCEDURE — 85025 COMPLETE CBC W/AUTO DIFF WBC: CPT

## 2019-07-10 PROCEDURE — 85027 COMPLETE CBC AUTOMATED: CPT

## 2019-07-10 PROCEDURE — 83735 ASSAY OF MAGNESIUM: CPT

## 2019-07-12 DIAGNOSIS — K94.19 OTHER COMPLICATIONS OF ENTEROSTOMY: ICD-10-CM

## 2019-08-09 ENCOUNTER — APPOINTMENT (OUTPATIENT)
Dept: THORACIC SURGERY | Facility: CLINIC | Age: 64
End: 2019-08-09
Payer: MEDICAID

## 2019-08-09 DIAGNOSIS — Z09 ENCOUNTER FOR FOLLOW-UP EXAMINATION AFTER COMPLETED TREATMENT FOR CONDITIONS OTHER THAN MALIGNANT NEOPLASM: ICD-10-CM

## 2019-08-09 PROCEDURE — 99024 POSTOP FOLLOW-UP VISIT: CPT

## 2019-08-12 VITALS
OXYGEN SATURATION: 98 % | WEIGHT: 104 LBS | HEART RATE: 96 BPM | SYSTOLIC BLOOD PRESSURE: 98 MMHG | RESPIRATION RATE: 16 BRPM | BODY MASS INDEX: 16.71 KG/M2 | DIASTOLIC BLOOD PRESSURE: 68 MMHG | HEIGHT: 66 IN

## 2019-08-12 PROBLEM — Z09 POSTOP CHECK: Status: ACTIVE | Noted: 2019-06-22

## 2019-10-04 ENCOUNTER — APPOINTMENT (OUTPATIENT)
Dept: THORACIC SURGERY | Facility: CLINIC | Age: 64
End: 2019-10-04
Payer: MEDICAID

## 2019-10-04 VITALS
RESPIRATION RATE: 18 BRPM | HEIGHT: 66 IN | WEIGHT: 100 LBS | TEMPERATURE: 96.4 F | SYSTOLIC BLOOD PRESSURE: 101 MMHG | DIASTOLIC BLOOD PRESSURE: 57 MMHG | BODY MASS INDEX: 16.07 KG/M2 | HEART RATE: 70 BPM | OXYGEN SATURATION: 100 %

## 2019-10-04 PROCEDURE — 99213 OFFICE O/P EST LOW 20 MIN: CPT

## 2019-10-04 NOTE — PHYSICAL EXAM
[] : no respiratory distress [Respiration, Rhythm And Depth] : normal respiratory rhythm and effort [Exaggerated Use Of Accessory Muscles For Inspiration] : no accessory muscle use [Auscultation Breath Sounds / Voice Sounds] : lungs were clear to auscultation bilaterally [Heart Sounds] : normal S1 and S2 [Apical Impulse] : the apical impulse was normal [Examination Of The Chest] : the chest was normal in appearance [Abnormal Walk] : normal gait [2+] : left 2+ [Nail Clubbing] : no clubbing  or cyanosis of the fingernails [Musculoskeletal - Swelling] : no joint swelling seen [Motor Tone] : muscle strength and tone were normal [Skin Turgor] : normal skin turgor [Skin Color & Pigmentation] : normal skin color and pigmentation [Oriented To Time, Place, And Person] : oriented to person, place, and time [No Focal Deficits] : no focal deficits

## 2019-10-09 NOTE — HISTORY OF PRESENT ILLNESS
[FreeTextEntry1] : 63 year old female, never smoker, no significant pmhx, Fujianese/ Mandarin speaking, was referred by gastroenterologist Dr. Sonia Daniels for evaluation of clinical Stage IIB (T4 N0 Mx) gastric adenocarcinoma of the lesser curvature of the stomach.\par \par EGD on 01/12/2019 showed a gastric mass with ulceration. Pathology revealed poorly differentiated adenocarcinoma.\par \par EUS on 01/18/2019:\par - mass in the lesser curvature of the stomach to be T4 No Mx adenocarcinoma. \par \par PET 2/6/19\par - Intense activity in patient's known gastric cancer. No evidence of local/distant metastatic disease\par - Nonspecific hilar activity may be inflammatory. \par \par She underwent induction chemotherapy with Dr Manjinder Garcia, completed 4/2019. \par \par PET 5/2/19\par - decrease size of gastric mass and decrease in abnormal activity\par - no change in multiple lung nodules. May be related to granulomatous disease\par \par EGD, laparoscopy, robotic assisted, total gastrectomy, johann-en-y, esophagojejunal anastomosis, laparoscopic feeding j tube on 5/24/19. \par Pathology: \par - No residual carcinoma (complete response)\par - 0/19 lymph nodes negative, margins negative\par She underwent adjuvant chemotherapy with Dr Manjinder Garcia. Only completed 2 cycles. Unable to tolerate. \par \par CT Chest Abd/Pelvis 9/6/19\par - s/p gastrectomy. No obstruction at the esophagojejunostomy. \par - 7mm triangular-shaped fissure based nodular density in RML (56)\par - 5mm right lung base, previously 7mm\par - Previously noted 0.5cm right apical nodule resolved. \par \par She returns today for a follow-up visit. She is tolerating PO intake, but reports lack of appetite. Her last chemotherapy dose at 2 months ago and does not plan on resuming. Per Dr Garcia, mediport to be removed if CT chest negative for recurrence. Denies dysphagia, abdominal pain, nausea, vomiting, diarrhea, constipation. J tube fell out recently. Wound healed.

## 2019-10-09 NOTE — CONSULT LETTER
[FreeTextEntry2] : Dr Sonia Daniels [FreeTextEntry3] : Milton Flores MD\par Professor, Cardiovascular & Thoracic Surgery\par [facility]\par Director of the [facility] \par Director of Thoracic Surgery, [facility]\par [facility]\par [address]\par [facility] 4th Floor\par [place] [id number]\par Phone: [phone]\par Fax: [phone]

## 2019-10-09 NOTE — ASSESSMENT
[FreeTextEntry1] : 63 year old female, never smoker, no significant pmhx, Fujianese/ Mandarin speaking, was referred by gastroenterologist Dr. Sonia Daniels for evaluation of clinical Stage IIB (T4 N0 Mx) gastric adenocarcinoma of the lesser curvature of the stomach.\par \par EGD on 01/12/2019 showed a gastric mass with ulceration. Pathology revealed poorly differentiated adenocarcinoma.\par \par EUS on 01/18/2019:\par - mass in the lesser curvature of the stomach to be T4 No Mx adenocarcinoma. \par \par PET 2/6/19\par - Intense activity in patient's known gastric cancer. No evidence of local/distant metastatic disease\par - Nonspecific hilar activity may be inflammatory. \par \par She underwent induction chemotherapy with Dr Manjinder Garcia, completed 4/2019. \par \par PET 5/2/19\par - decrease size of gastric mass and decrease in abnormal activity\par - no change in multiple lung nodules. May be related to granulomatous disease\par \par EGD, laparoscopy, robotic assisted, total gastrectomy, johann-en-y, esophagojejunal anastomosis, laparoscopic feeding j tube on 5/24/19. \par Pathology: \par - No residual carcinoma (complete response)\par - 0/19 lymph nodes negative, margins negative\par She underwent adjuvant chemotherapy with Dr Manjinder Garcia. Only completed 2 cycles. Unable to tolerate. \par \par CT Chest Abd/Pelvis 9/6/19\par - s/p gastrectomy. No obstruction at the esophagojejunostomy. \par - 7mm triangular-shaped fissure based nodular density in RML (56)\par - 5mm right lung base, previously 7mm\par - Previously noted 0.5cm right apical nodule resolved. \par \par She returns today for a follow-up visit. She is tolerating PO intake, but reports lack of appetite. Her last chemotherapy dose at 2 months ago and does not plan on resuming. Per Dr Garcia, mediport to be removed if CT chest negative for recurrence. Denies dysphagia, abdominal pain, nausea, vomiting, diarrhea, constipation. J tube fell out recently. Wound healed. \par \par CT 9/6/19 reviewed with patient and . No evidence of disease s/p gastrectomy. 7mm nodularity noted to RML. We will closely observe with CT. \par I have reviewed the patient's medical records and diagnostic images at the time of this office consultation and have made the following recommendation.\par Plan:\par 1. Mediport removal Friday, 11/8\par 2. Labs, medical clearance per  policy\par 3. Supplement with Ensure, at least 2-3 cans a day\par 4. RTC 6m with CT Chest and CT Abd/Pelvis with PO/IV Contrast, can be ordered by Dr Garcia.

## 2019-10-09 NOTE — REVIEW OF SYSTEMS
[As Noted in HPI] : as noted in HPI [Negative] : Neurological [FreeTextEntry2] : lost 4 lbs since last visit. j tube fell out

## 2019-11-08 ENCOUNTER — APPOINTMENT (OUTPATIENT)
Dept: THORACIC SURGERY | Facility: HOSPITAL | Age: 64
End: 2019-11-08

## 2020-02-28 ENCOUNTER — APPOINTMENT (OUTPATIENT)
Dept: THORACIC SURGERY | Facility: CLINIC | Age: 65
End: 2020-02-28
Payer: MEDICARE

## 2020-02-28 VITALS
HEART RATE: 67 BPM | WEIGHT: 91 LBS | OXYGEN SATURATION: 100 % | DIASTOLIC BLOOD PRESSURE: 53 MMHG | HEIGHT: 66 IN | TEMPERATURE: 96.2 F | SYSTOLIC BLOOD PRESSURE: 101 MMHG | RESPIRATION RATE: 18 BRPM | BODY MASS INDEX: 14.63 KG/M2

## 2020-02-28 PROCEDURE — 99213 OFFICE O/P EST LOW 20 MIN: CPT

## 2020-03-03 NOTE — ASSESSMENT
[FreeTextEntry1] : 64 year old female, never smoker, no significant pmhx, Fujianese/ Mandarin speaking, was referred by gastroenterologist Dr. Sonia Daniels for evaluation of clinical Stage IIB (T4 N0 Mx) gastric adenocarcinoma of the lesser curvature of the stomach. She underwent EGD, laparoscopy, robotic assisted, total gastrectomy, johann-en-y, esophagojejunal anastomosis, laparoscopic feeding j tube on 5/24/19 with pathology: no residual carcinoma (complete response),  0/19 lymph nodes negative, margins negative\par She underwent adjuvant chemotherapy with Dr Manjinder Garcia. Only completed 2 cycles, unable to tolerate. \par \par EGD on 01/12/2019 showed a gastric mass with ulceration. Pathology revealed poorly differentiated adenocarcinoma.\par \par EUS on 01/18/2019:\par - mass in the lesser curvature of the stomach to be T4 N0 Mx adenocarcinoma. \par \par PET 2/6/19\par - Intense activity in patient's known gastric cancer. No evidence of local/distant metastatic disease\par - Nonspecific hilar activity may be inflammatory. \par \par She underwent induction chemotherapy with Dr Manjinder Garcia, completed 4/2019. \par \par PET 5/2/19\par - decrease size of gastric mass and decrease in abnormal activity\par - no change in multiple lung nodules. May be related to granulomatous disease\par \par SHe is s/p EGD, laparoscopy, robotic assisted, total gastrectomy, johann-en-y, esophagojejunal anastomosis, laparoscopic feeding j tube on 5/24/19. \par Pathology: \par - No residual carcinoma (complete response)\par - 0/19 lymph nodes negative, margins negative\par She underwent adjuvant chemotherapy with Dr Manjinder Garcia. Only completed 2 cycles. Unable to tolerate. \par \par CT Chest Abd/Pelvis 9/6/19\par - s/p gastrectomy. No obstruction at the esophagojejunostomy. \par - 7mm triangular-shaped fissure based nodular density in RML (56)\par - 5mm right lung base, previously 7mm\par - Previously noted 0.5cm right apical nodule resolved. \par \par CT Chest Abd/Pelvis 2/3/20\par - Trace simple free fluid right paracolic gutter\par - Multiple subcentimeter low-density lesion in the liver unchanged to small to characterize\par - simple liver cyst\par - Stable hemangioma right hepatic lobe\par - Vascular lesion present in the late arterial early portal venous phase within the medial segment of the left lobe of the liver - indeterminate. \par - heterogenous myomatous uterus, prominent endometrium\par \par Overall, patient reports decrease in appetite since diagnosis. Lost 9 lbs since last visit. She denies dysphagia, abdominal pain. CT 2/3/30 reviewed with patient and . CT stable, no clinical or radiographic evidence of recurrent disease. \par \par Advise patient to eat small frequent meals to increase weight. Supplement with Ensure. \par \par I have reviewed the patient's medical records and diagnostic images at the time of this office consultation and have made the following recommendation.\par Plan:\par 1. Eat small, frequent meals. Refrain from eating 4 hours before bed time. Supplement with at least 2-3 cans of Ensure\par 2. RTC 6m with CT abdomen/pelvis with contrast

## 2020-03-03 NOTE — PHYSICAL EXAM
[Apical Impulse] : the apical impulse was normal [Heart Sounds] : normal S1 and S2 [Heart Rate And Rhythm] : heart rate was normal and rhythm regular [Examination Of The Chest] : the chest was normal in appearance [Abnormal Walk] : normal gait [2+] : left 2+ [Musculoskeletal - Swelling] : no joint swelling seen [Skin Color & Pigmentation] : normal skin color and pigmentation [No Focal Deficits] : no focal deficits [Skin Turgor] : normal skin turgor [Oriented To Time, Place, And Person] : oriented to person, place, and time

## 2020-03-03 NOTE — CONSULT LETTER
[FreeTextEntry3] : Milton Flores MD\par Professor, Cardiovascular & Thoracic Surgery\par [facility]\par Director of the [facility] \par Director of Thoracic Surgery, [facility]\par [facility]\par [address]\par [facility] 4th Floor\par [place] [id number]\par Phone: [phone]\par Fax: [phone]

## 2020-03-03 NOTE — END OF VISIT
[FreeTextEntry3] : I, Saira Quinones, am scribing for Dr. Flores the following sections: HISTORY OF PRESENT ILLNESS, PAST MEDICAL/FAMILY/SOCIAL HISTORY, REVIEW OF SYSTEMS, VITAL SIGNS, PHYSICAL EXAM AND DISPOSITION.

## 2020-03-03 NOTE — HISTORY OF PRESENT ILLNESS
[FreeTextEntry1] : 64 year old female, never smoker, no significant pmhx, Fujianese/ Mandarin speaking, was referred by gastroenterologist Dr. Sonia Daniels for evaluation of clinical Stage IIB (T4 N0 Mx) gastric adenocarcinoma of the lesser curvature of the stomach. She underwent EGD, laparoscopy, robotic assisted, total gastrectomy, johann-en-y, esophagojejunal anastomosis, laparoscopic feeding j tube on 5/24/19 with pathology: no residual carcinoma (complete response),  0/19 lymph nodes negative, margins negative\par She underwent adjuvant chemotherapy with Dr Manjinder Garcia. Only completed 2 cycles, unable to tolerate. \par \par EGD on 01/12/2019 showed a gastric mass with ulceration. Pathology revealed poorly differentiated adenocarcinoma.\par \par EUS on 01/18/2019:\par - mass in the lesser curvature of the stomach to be T4 N0 Mx adenocarcinoma. \par \par PET 2/6/19\par - Intense activity in patient's known gastric cancer. No evidence of local/distant metastatic disease\par - Nonspecific hilar activity may be inflammatory. \par \par She underwent induction chemotherapy with Dr Manjinder Garcia, completed 4/2019. \par \par PET 5/2/19\par - decrease size of gastric mass and decrease in abnormal activity\par - no change in multiple lung nodules. May be related to granulomatous disease\par \par She is s/p EGD, laparoscopy, robotic assisted, total gastrectomy, johann-en-y, esophagojejunal anastomosis, laparoscopic feeding j tube on 5/24/19. \par Pathology: \par - No residual carcinoma (complete response)\par - 0/19 lymph nodes negative, margins negative\par She underwent adjuvant chemotherapy with Dr Manjinder Garcia. Only completed 2 cycles. Unable to tolerate. \par \par CT Chest Abd/Pelvis 9/6/19\par - s/p gastrectomy. No obstruction at the esophagojejunostomy. \par - 7mm triangular-shaped fissure based nodular density in RML (56)\par - 5mm right lung base, previously 7mm\par - Previously noted 0.5cm right apical nodule resolved. \par \par CT Chest Abd/Pelvis 2/3/20\par - Trace simple free fluid right paracolic gutter\par - Multiple subcentimeter low-density lesion in the liver unchanged to small to characterize\par - simple liver cyst\par - Stable hemangioma right hepatic lobe\par - Vascular lesion present in the late arterial early portal venous phase within the medial segment of the left lobe of the liver - indeterminate. \par - heterogenous myomatous uterus, prominent endometrium\par

## 2020-08-28 ENCOUNTER — APPOINTMENT (OUTPATIENT)
Dept: THORACIC SURGERY | Facility: CLINIC | Age: 65
End: 2020-08-28
Payer: MEDICARE

## 2020-08-28 PROCEDURE — 99211 OFF/OP EST MAY X REQ PHY/QHP: CPT

## 2020-09-01 NOTE — HISTORY OF PRESENT ILLNESS
[Home] : at home, [unfilled] , at the time of the visit. [Medical Office: ([facility])___] : at the medical office located in  [Verbal consent obtained from patient] : the patient, [unfilled] [FreeTextEntry1] : \par 64 year old female, never smoker, no significant pmhx, Fujianese/ Mandarin speaking, was referred by gastroenterologist Dr. Sonia Daniels for evaluation of clinical Stage IIB (T4 N0 Mx) gastric adenocarcinoma of the lesser curvature of the stomach. She underwent induction chemotherapy with Dr Manjinder Garcia, completed 4/2019. She is s/p EGD, laparoscopy, robotic assisted, total gastrectomy, johann-en-y, esophagojejunal anastomosis, laparoscopic feeding j tube on 5/24/19 with pathology: no residual carcinoma (complete response), 0/19 lymph nodes negative, margins negative\par She underwent adjuvant chemotherapy with Dr Manjinder Garcia. Only completed 2 cycles, unable to tolerate. \par \par CT Chest Abd/Pelvis 9/6/19\par - s/p gastrectomy. No obstruction at the esophagojejunostomy. \par - 7mm triangular-shaped fissure based nodular density in RML (56)\par - 5mm right lung base, previously 7mm\par - Previously noted 0.5cm right apical nodule resolved. \par \par CT Chest Abd/Pelvis 2/3/20\par - Trace simple free fluid right paracolic gutter\par - Multiple subcentimeter low-density lesion in the liver unchanged to small to characterize\par - simple liver cyst\par - Stable hemangioma right hepatic lobe\par - Vascular lesion present in the late arterial early portal venous phase within the medial segment of the left lobe of the liver - indeterminate. \par - heterogenous myomatous uterus, prominent endometrium\par \par CT Chest/Abdomen/pelvis with PO/IV contrast 8/24/20\par - Unchanged bilateral pulmonary nodules\par - Unchanged tiny hypodensities in the liver likely representing cysts

## 2020-09-01 NOTE — ASSESSMENT
[FreeTextEntry1] : 64 year old female, never smoker, no significant pmhx, Fujianese/ Mandarin speaking, was referred by gastroenterologist Dr. Sonia Daniels for evaluation of clinical Stage IIB (T4 N0 Mx) gastric adenocarcinoma of the lesser curvature of the stomach. She underwent induction chemotherapy with Dr Manjinder Garcia, completed 4/2019. She is s/p EGD, laparoscopy, robotic assisted, total gastrectomy, johann-en-y, esophagojejunal anastomosis, laparoscopic feeding j tube on 5/24/19 with pathology: no residual carcinoma (complete response), 0/19 lymph nodes negative, margins negative\par She underwent adjuvant chemotherapy with Dr Manjinder Garcia. Only completed 2 cycles, unable to tolerate. \par \par CT Chest Abd/Pelvis 9/6/19\par - s/p gastrectomy. No obstruction at the esophagojejunostomy. \par - 7mm triangular-shaped fissure based nodular density in RML (56)\par - 5mm right lung base, previously 7mm\par - Previously noted 0.5cm right apical nodule resolved. \par \par CT Chest Abd/Pelvis 2/3/20\par - Trace simple free fluid right paracolic gutter\par - Multiple subcentimeter low-density lesion in the liver unchanged to small to characterize\par - simple liver cyst\par - Stable hemangioma right hepatic lobe\par - Vascular lesion present in the late arterial early portal venous phase within the medial segment of the left lobe of the liver - indeterminate. \par - heterogenous myomatous uterus, prominent endometrium\par \par CT Chest/Abdomen/pelvis with PO/IV contrast 8/24/20\par - Unchanged bilateral pulmonary nodules\par - Unchanged tiny hypodensities in the liver likely representing cysts\par \par Patient doing well, tolerating PO intake better. She was started on an appetite stimulant and was able to gain weight. Denies dysphagia, abdominal pain, nausea, vomiting, diarrhea, constipation.\par \par I have reviewed the patient's medical records and diagnostic images at the time of this office consultation and have made the following recommendation.\par Plan:\par 1. CT Chest Abdomen/Pelvis in 6m\par

## 2020-09-01 NOTE — END OF VISIT
[Time Spent: ___ minutes] : I have spent [unfilled] minutes of time on the encounter. [FreeTextEntry3] : I, JASON FISCHER , am scribing for and in the presence of SOFIE RAMÍREZ the following sections: history of present illness, past medical/family/surgical/family/social history, review of systems, vital signs, physical exam, and disposition.\par

## 2020-09-01 NOTE — CONSULT LETTER
[Consult Letter:] : I had the pleasure of evaluating your patient, [unfilled]. [Dear  ___] : Dear  [unfilled], [Consult Closing:] : Thank you very much for allowing me to participate in the care of this patient.  If you have any questions, please do not hesitate to contact me. [Please see my note below.] : Please see my note below. [DrJose Alejandro  ___] : Dr. DEWITT [Sincerely,] : Sincerely, [DrJose Alejandro ___] : Dr. DEWITT [FreeTextEntry3] : Milton Flores MD\par Professor, Cardiovascular & Thoracic Surgery\par [facility]\par Director of the [facility] \par Director of Thoracic Surgery, [facility]\par [facility]\par [address]\par [facility] 4th Floor\par [place] [address]\par Phone: [phone]\par Fax: [phone]

## 2021-02-19 ENCOUNTER — APPOINTMENT (OUTPATIENT)
Dept: THORACIC SURGERY | Facility: CLINIC | Age: 66
End: 2021-02-19
Payer: MEDICARE

## 2021-02-19 PROCEDURE — 99212 OFFICE O/P EST SF 10 MIN: CPT

## 2021-02-19 PROCEDURE — 99072 ADDL SUPL MATRL&STAF TM PHE: CPT

## 2021-02-19 NOTE — ASSESSMENT
[FreeTextEntry1] : 65 year old female, never smoker, no significant pmhx, Fujianese/ Mandarin speaking, was referred by gastroenterologist Dr. Sonia Daniels for evaluation of clinical Stage IIB (T4 N0 Mx) gastric adenocarcinoma of the lesser curvature of the stomach. She underwent induction chemotherapy with Dr Manjinder Garcia, completed 4/2019. She is s/p EGD, laparoscopy, robotic assisted, total gastrectomy, johann-en-y, esophagojejunal anastomosis, laparoscopic feeding j tube on 5/24/19 with pathology: no residual carcinoma (complete response), 0/19 lymph nodes negative, margins negative\par \par She underwent adjuvant chemotherapy with Dr Manjinder Garcia. Only completed 2 cycles, unable to tolerate. \par \par CT Chest Abd/Pelvis 9/6/19\par - s/p gastrectomy. No obstruction at the esophagojejunostomy. \par - 7mm triangular-shaped fissure based nodular density in RML (56)\par - 5mm right lung base, previously 7mm\par - Previously noted 0.5cm right apical nodule resolved. \par \par CT Chest Abd/Pelvis 2/3/20\par - Trace simple free fluid right paracolic gutter\par - Multiple subcentimeter low-density lesion in the liver unchanged to small to characterize\par - simple liver cyst\par - Stable hemangioma right hepatic lobe\par - Vascular lesion present in the late arterial early portal venous phase within the medial segment of the left lobe of the liver - indeterminate. \par - heterogenous myomatous uterus, prominent endometrium\par \par CT Chest/Abdomen/pelvis with PO/IV contrast 8/24/20\par - Unchanged bilateral pulmonary nodules\par - Unchanged tiny hypodensities in the liver likely representing cysts \par \par CT Chest Abdomen/Pelvis with PO/IV 2/6/21\par - No change fro prior exam \par \par Patient presents today for a follow-up visit. Patient doing well, tolerating PO intake well. Denies dysphagia, abdominal pain, nausea, vomiting, diarrhea, constipation, weight loss. She weighs 98lbs. \par \par CT Chest Abdomen/Pelvis with PO/IV 2/6/21 reviewed with patient. CT stable, no clinical or radiographic evidence of recurrent disease. \par \par I have reviewed the patient's medical records and diagnostic images at the time of this office consultation and have made the following recommendation.\par Plan:\par 1. CT Chest/Abdomen/Pelvis in 6 months

## 2021-02-19 NOTE — HISTORY OF PRESENT ILLNESS
[Home] : at home, [unfilled] , at the time of the visit. [Medical Office: ([facility])___] : at the medical office located in  [Verbal consent obtained from patient] : the patient, [unfilled] [FreeTextEntry1] : \par 65 year old female, never smoker, no significant pmhx, Fujianese/ Mandarin speaking, was referred by gastroenterologist Dr. Sonia Daniels for evaluation of clinical Stage IIB (T4 N0 Mx) gastric adenocarcinoma of the lesser curvature of the stomach. She underwent induction chemotherapy with Dr Manjinder Garcia, completed 4/2019. She is s/p EGD, laparoscopy, robotic assisted, total gastrectomy, johann-en-y, esophagojejunal anastomosis, laparoscopic feeding j tube on 5/24/19 with pathology: no residual carcinoma (complete response), 0/19 lymph nodes negative, margins negative\par \par She underwent adjuvant chemotherapy with Dr Manjinder Garcia. Only completed 2 cycles, unable to tolerate. \par \par CT Chest Abd/Pelvis 9/6/19\par - s/p gastrectomy. No obstruction at the esophagojejunostomy. \par - 7mm triangular-shaped fissure based nodular density in RML (56)\par - 5mm right lung base, previously 7mm\par - Previously noted 0.5cm right apical nodule resolved. \par \par CT Chest Abd/Pelvis 2/3/20\par - Trace simple free fluid right paracolic gutter\par - Multiple subcentimeter low-density lesion in the liver unchanged to small to characterize\par - simple liver cyst\par - Stable hemangioma right hepatic lobe\par - Vascular lesion present in the late arterial early portal venous phase within the medial segment of the left lobe of the liver - indeterminate. \par - heterogenous myomatous uterus, prominent endometrium\par \par CT Chest/Abdomen/pelvis with PO/IV contrast 8/24/20\par - Unchanged bilateral pulmonary nodules\par - Unchanged tiny hypodensities in the liver likely representing cysts \par \par CT Chest Abdomen/Pelvis with PO/IV 2/6/21\par - No change fro prior exam

## 2021-02-19 NOTE — CONSULT LETTER
[Dear  ___] : Dear  [unfilled], [Consult Letter:] : I had the pleasure of evaluating your patient, [unfilled]. [Please see my note below.] : Please see my note below. [Consult Closing:] : Thank you very much for allowing me to participate in the care of this patient.  If you have any questions, please do not hesitate to contact me. [Sincerely,] : Sincerely, [FreeTextEntry3] : Milton Flores MD\par Professor, Cardiovascular & Thoracic Surgery\par [facility]\par Director of the [facility] \par Director of Thoracic Surgery, [facility]\par \par [facility]\par [address]\par [facility] 4th Floor\par [place] [address]\par Phone: [phone]\par Fax: [phone]

## 2021-09-15 PROBLEM — C16.9 GASTRIC CANCER: Status: ACTIVE | Noted: 2019-01-23

## 2021-09-17 ENCOUNTER — APPOINTMENT (OUTPATIENT)
Dept: THORACIC SURGERY | Facility: CLINIC | Age: 66
End: 2021-09-17
Payer: MEDICARE

## 2021-09-17 DIAGNOSIS — C16.9 MALIGNANT NEOPLASM OF STOMACH, UNSPECIFIED: ICD-10-CM

## 2021-09-17 PROCEDURE — 99213 OFFICE O/P EST LOW 20 MIN: CPT

## 2021-09-17 PROCEDURE — 99447 NTRPROF PH1/NTRNET/EHR 11-20: CPT

## 2021-09-17 NOTE — REASON FOR VISIT
[Follow-Up: _____] : a [unfilled] follow-up visit [Home] : at home, [unfilled] , at the time of the visit. [Medical Office: ([facility])___] : at the medical office located in  [Verbal consent obtained from patient] : the patient, [unfilled] No

## 2021-09-20 RX ORDER — AMOXICILLIN AND CLAVULANATE POTASSIUM 875; 125 MG/1; MG/1
875-125 TABLET, COATED ORAL
Qty: 14 | Refills: 0 | Status: DISCONTINUED | COMMUNITY
Start: 2019-07-12 | End: 2021-09-20

## 2021-09-20 RX ORDER — CHLORHEXIDINE GLUCONATE, 0.12% ORAL RINSE 1.2 MG/ML
0.12 SOLUTION DENTAL
Qty: 473 | Refills: 0 | Status: DISCONTINUED | COMMUNITY
Start: 2021-09-08

## 2021-09-20 NOTE — ASSESSMENT
[FreeTextEntry1] : 65 year old female, never smoker, no significant pmhx, Fujianese/ Mandarin speaking, was referred by gastroenterologist Dr. Sonia Daniels for evaluation of clinical Stage IIB (T4 N0 Mx) gastric adenocarcinoma of the lesser curvature of the stomach. She underwent induction chemotherapy with Dr Manjinder Garcia, completed 4/2019. She is s/p EGD, laparoscopy, robotic assisted, total gastrectomy, johann-en-y, esophagojejunal anastomosis, laparoscopic feeding j tube on 5/24/19 with pathology: no residual carcinoma (complete response), 0/19 lymph nodes negative, margins negative\par \par She underwent adjuvant chemotherapy with Dr Manjinder Garcia. Only completed 2 cycles, unable to tolerate. \par \par CT Chest Abd/Pelvis 9/6/19\par - s/p gastrectomy. No obstruction at the esophagojejunostomy. \par - 7mm triangular-shaped fissure based nodular density in RML (56)\par - 5mm right lung base, previously 7mm\par - Previously noted 0.5cm right apical nodule resolved. \par \par CT Chest Abd/Pelvis 2/3/20\par - Trace simple free fluid right paracolic gutter\par - Multiple subcentimeter low-density lesion in the liver unchanged to small to characterize\par - simple liver cyst\par - Stable hemangioma right hepatic lobe\par - Vascular lesion present in the late arterial early portal venous phase within the medial segment of the left lobe of the liver - indeterminate. \par - heterogenous myomatous uterus, prominent endometrium\par \par CT Chest/Abdomen/pelvis with PO/IV contrast 8/24/20\par - Unchanged bilateral pulmonary nodules\par - Unchanged tiny hypodensities in the liver likely representing cysts \par \par CT Chest Abdomen/Pelvis with PO/IV 2/6/21\par - No change fro prior exam \par \par Patient now presents for a 6 month follow up. \par \par CT C/A/P 09/09/2021\par - stable bilateral lung nodules\par - trace free fluid in the pelvis, unchanged\par - mid stranding of the fat adjacent to the ascending colon present on prior exam but new from CT scan of 9/6/2019 and CT scan of 2/3/2020. Underlying mild peritoneal carcinomatosis cannot be excluded.\par - stable liver cysts and low-density lesions within the liver\par - stable liver hemangioma\par \par CT C/A/P was reviewed with the patient. She was advised to follow up with oncologist about the possible mild peritoneal carcinomatosis. Patient reported good swallowing and appetite and denies weight loss. \par \par I have reviewed the patient's medical records and diagnostic images at the time of this office visit and have made the following recommendations.\par Plan:\par 1. RTC in 3 months with PET/CT.\par 2. Follow up with oncologist.

## 2021-09-20 NOTE — HISTORY OF PRESENT ILLNESS
[FreeTextEntry1] : 65 year old female, never smoker, no significant pmhx, Fujianese/ Mandarin speaking, was referred by gastroenterologist Dr. Sonia Daniels for evaluation of clinical Stage IIB (T4 N0 Mx) gastric adenocarcinoma of the lesser curvature of the stomach. She underwent induction chemotherapy with Dr Manjinder Garcia, completed 4/2019. She is s/p EGD, laparoscopy, robotic assisted, total gastrectomy, johann-en-y, esophagojejunal anastomosis, laparoscopic feeding j tube on 5/24/19 with pathology: no residual carcinoma (complete response), 0/19 lymph nodes negative, margins negative\par \par She underwent adjuvant chemotherapy with Dr Manjinder Garcia. Only completed 2 cycles, unable to tolerate. \par \par CT Chest Abd/Pelvis 9/6/19\par - s/p gastrectomy. No obstruction at the esophagojejunostomy. \par - 7mm triangular-shaped fissure based nodular density in RML (56)\par - 5mm right lung base, previously 7mm\par - Previously noted 0.5cm right apical nodule resolved. \par \par CT Chest Abd/Pelvis 2/3/20\par - Trace simple free fluid right paracolic gutter\par - Multiple subcentimeter low-density lesion in the liver unchanged to small to characterize\par - simple liver cyst\par - Stable hemangioma right hepatic lobe\par - Vascular lesion present in the late arterial early portal venous phase within the medial segment of the left lobe of the liver - indeterminate. \par - heterogenous myomatous uterus, prominent endometrium\par \par CT Chest/Abdomen/pelvis with PO/IV contrast 8/24/20\par - Unchanged bilateral pulmonary nodules\par - Unchanged tiny hypodensities in the liver likely representing cysts \par \par CT Chest Abdomen/Pelvis with PO/IV 2/6/21\par - No change fro prior exam \par \par Patient now presents for a 6 month follow up. \par \par CT C/A/P 09/09/2021\par - stable bilateral lung nodules\par - trace free fluid in the pelvis, unchanged\par - mid stranding of the fat adjacent to the ascending colon present on prior exam but new from CT scan of 9/6/2019 and CT scan of 2/3/2020. Underlying mild peritoneal carcinomatosis cannot be excluded.\par - stable liver cysts and low-density lesions within the liver\par - stable liver hemangioma

## 2021-09-20 NOTE — END OF VISIT
[FreeTextEntry3] : I, FARHAT VARGAS, am scribing for and in the presence of Dr. SOFIE RAMÍREZ the following sections: history of present illness, past medical/surgical/family/social/ allergy history, review of systems, vital signs, physical exam, and disposition.\par \par I personally performed the services described in the documentation, reviewed the documentation recorded by the scribe in my presence and it accurately and completely records my words and actions.

## 2021-09-20 NOTE — CONSULT LETTER
[Courtesy Letter:] : I had the pleasure of seeing your patient, [unfilled], in my office today. [Please see my note below.] : Please see my note below. [Consult Closing:] : Thank you very much for allowing me to participate in the care of this patient.  If you have any questions, please do not hesitate to contact me. [Sincerely,] : Sincerely, [FreeTextEntry2] : Dr. Sonia Daniels (Ref)\par Dr. Blaire Venegas\par Dr. Manjinder Garcia [FreeTextEntry3] : Mitlon Flores MD\par Professor, Cardiovascular & Thoracic Surgery\par [employer] [employer]\par Director of the [facility] \par Director of Thoracic Surgery, [facility]\par \par [facility]\par [address]\par [facility] 4th Floor\par [place] [address]\par Phone: [phone]\par Fax: [phone]

## 2021-12-08 ENCOUNTER — NON-APPOINTMENT (OUTPATIENT)
Age: 66
End: 2021-12-08

## 2022-01-17 NOTE — PROGRESS NOTE ADULT - ASSESSMENT
-Please follow up with Dr. Flores on 6/7/19 at 12:00pm.  The office is located at [facility], [facility], 4th floor. Call us with any questions, #[phone].  -Please follow up with Dr. Sonia Daniels (referring MD) on 6/10/2019 at 9am. Address: [address] [place] [address]. Phone: [phone]  Diet Instructions	Clear liquid.  -Please continue your tube feeds at night as directed. If you have any abdominal pain, nausea or vomiting, please call our office.  -Please continue a clear liquid diet until you see Dr. Flores in the office.   -Please keep the dressing where your drain was on for 48 hours after discharge. Then you can remove it and keep it open to air.  -The staples at your incision site will be removed at follow up with Dr. Flores.   -Walk daily as tolerated and use your incentive spirometer every hour.  -No driving or strenuous activity/exercise for 6 weeks, or until cleared by your surgeon.  -You may shower.  Be sure to gently clean your incisions with anti-bacterial soap and water daily, pat dry.  You may leave them open to air.  -Call your doctor if you have shortness of breath, chest pain not relieved by pain medication, dizziness, fever >101.5, or increased redness or drainage from incisions. Patient/Family

## 2022-10-05 ENCOUNTER — APPOINTMENT (OUTPATIENT)
Dept: THORACIC SURGERY | Facility: CLINIC | Age: 67
End: 2022-10-05

## 2022-10-05 DIAGNOSIS — Z85.028 ENCOUNTER FOR FOLLOW-UP EXAMINATION AFTER COMPLETED TREATMENT FOR MALIGNANT NEOPLASM: ICD-10-CM

## 2022-10-05 DIAGNOSIS — Z08 ENCOUNTER FOR FOLLOW-UP EXAMINATION AFTER COMPLETED TREATMENT FOR MALIGNANT NEOPLASM: ICD-10-CM

## 2022-10-05 DIAGNOSIS — R91.1 SOLITARY PULMONARY NODULE: ICD-10-CM

## 2022-10-05 PROCEDURE — 99442: CPT

## 2022-10-05 NOTE — HISTORY OF PRESENT ILLNESS
[FreeTextEntry1] : 66 year old female, never smoker, no significant pmhx, Fujianese/ Mandarin speaking, was referred by gastroenterologist Dr. Sonia Daniels for evaluation of clinical Stage IIB (T4 N0 Mx) gastric adenocarcinoma of the lesser curvature of the stomach. She completed neoadjuvant chemotherapy with Dr Manjinder Garcia in 4/2019. \par \par She underwent EGD, laparoscopy, robotic assisted, total gastrectomy, johann-en-y, esophagojejunal anastomosis, laparoscopic feeding j tube on 5/24/19 with pathology: no residual carcinoma (complete response), 0/19 lymph nodes negative, margins negative. Had 2 cycles of adjuvant chemotherapy Dr Manjinder Garcia (unable to tolerate).  \par \par She present today with a follow up CT C/A/P. Pt reports in her usual health status, denies unintentional weight loss, cough, chest pain, abd/pelvic pain. \par \par CT Chest Abd/Pelvis 9/6/19\par - s/p gastrectomy. No obstruction at the esophagojejunostomy. \par - 7mm triangular-shaped fissure based nodular density in RML (56)\par - 5mm right lung base, previously 7mm\par - Previously noted 0.5cm right apical nodule resolved. \par \par CT Chest Abd/Pelvis 2/3/20\par - Trace simple free fluid right paracolic gutter\par - Multiple subcentimeter low-density lesion in the liver unchanged to small to characterize\par - simple liver cyst\par - Stable hemangioma right hepatic lobe\par - Vascular lesion present in the late arterial early portal venous phase within the medial segment of the left lobe of the liver - indeterminate. \par - heterogenous myomatous uterus, prominent endometrium\par \par CT Chest/Abdomen/pelvis with PO/IV contrast 8/24/20\par - Unchanged bilateral pulmonary nodules\par - Unchanged tiny hypodensities in the liver likely representing cysts \par \par CT Chest Abdomen/Pelvis with PO/IV 2/6/21\par - No change fro prior exam \par \par CT C/A/P 09/09/2021\par - stable bilateral lung nodules\par - trace free fluid in the pelvis, unchanged\par - mid stranding of the fat adjacent to the ascending colon present on prior exam but new from CT scan of 9/6/2019 and CT scan of 2/3/2020. Underlying mild peritoneal carcinomatosis cannot be excluded.\par - stable liver cysts and low-density lesions within the liver\par - stable liver hemangioma \par \par CT C/A/P on 12/07/2021\par - stable b/l lung nodules, no new nodules\par - No lymphadenopathy\par - trace pelvic ascites, decreased in volume from the prior exam.\par - no other significant interval changes when compared to 09/09/21. \par \par CT C/A/P on 09/30/22\par - Motion degraded study. Scattered b/l nodular densities measuring up to 6mm in the right lung base. \par - small amount of free simple fluid in the cul-de-sac, of unclear etiology\par - otherwise, no significant interval change.

## 2022-10-05 NOTE — ASSESSMENT
[FreeTextEntry1] : 66 year old female, never smoker, no significant pmhx, Fujianese/ Mandarin speaking, was referred by gastroenterologist Dr. Sonia Daniels for evaluation of clinical Stage IIB (T4 N0 Mx) gastric adenocarcinoma of the lesser curvature of the stomach. She completed neoadjuvant chemotherapy with Dr Manjinder Garcia in 4/2019. \par \par She underwent EGD, laparoscopy, robotic assisted, total gastrectomy, johann-en-y, esophagojejunal anastomosis, laparoscopic feeding j tube on 5/24/19 with pathology: no residual carcinoma (complete response), 0/19 lymph nodes negative, margins negative. Had 2 cycles of adjuvant chemotherapy Dr Manjinder Garcia (unable to tolerate).  \par \par CT C/A/P on 09/30/22 was reviewed, stable b/l nodular densities measuring up to 6mm, small amount of free simple fluid in the cul-de-sac of unclear etiology. No other significant interval change. Pt reports in her usual health status, denies unintentional weight loss, cough, chest pain, abd/pelvic pain. Suggested the pt to follow up Dr. Garcia for stage II gastric ca. management, will repeat a CT C/A/P in 12 months, to be ordered by Dr. Garcia. \par \par Plan: follow up in one year with a CT C/A/P.

## 2022-10-07 ENCOUNTER — APPOINTMENT (OUTPATIENT)
Dept: THORACIC SURGERY | Facility: CLINIC | Age: 67
End: 2022-10-07

## 2023-10-06 ENCOUNTER — APPOINTMENT (OUTPATIENT)
Dept: THORACIC SURGERY | Facility: CLINIC | Age: 68
End: 2023-10-06

## 2024-01-24 ENCOUNTER — NON-APPOINTMENT (OUTPATIENT)
Age: 69
End: 2024-01-24

## 2024-07-31 ENCOUNTER — NON-APPOINTMENT (OUTPATIENT)
Age: 69
End: 2024-07-31